# Patient Record
Sex: FEMALE | Race: WHITE | NOT HISPANIC OR LATINO | Employment: OTHER | URBAN - NONMETROPOLITAN AREA
[De-identification: names, ages, dates, MRNs, and addresses within clinical notes are randomized per-mention and may not be internally consistent; named-entity substitution may affect disease eponyms.]

---

## 2021-03-09 ENCOUNTER — OFFICE VISIT (OUTPATIENT)
Dept: OBSTETRICS AND GYNECOLOGY | Facility: CLINIC | Age: 41
End: 2021-03-09

## 2021-03-09 VITALS — SYSTOLIC BLOOD PRESSURE: 120 MMHG | DIASTOLIC BLOOD PRESSURE: 72 MMHG | WEIGHT: 124.4 LBS | BODY MASS INDEX: 22.75 KG/M2

## 2021-03-09 DIAGNOSIS — N84.1 CERVICAL POLYP: ICD-10-CM

## 2021-03-09 DIAGNOSIS — Z12.4 ENCOUNTER FOR PAPANICOLAOU SMEAR FOR CERVICAL CANCER SCREENING: ICD-10-CM

## 2021-03-09 DIAGNOSIS — Z01.419 WOMEN'S ANNUAL ROUTINE GYNECOLOGICAL EXAMINATION: Primary | ICD-10-CM

## 2021-03-09 PROCEDURE — 99386 PREV VISIT NEW AGE 40-64: CPT | Performed by: OBSTETRICS & GYNECOLOGY

## 2021-03-09 RX ORDER — CITALOPRAM 10 MG/1
10 TABLET ORAL NIGHTLY
COMMUNITY

## 2021-03-09 NOTE — PROGRESS NOTES
[facility]  Gynecology    CC: Annual well woman exam    HPI  John Puga is a 40 y.o.  premenopausal female who presents for her annual well woman exam.  The patient has no complaints.  Denies any vaginal itching, burning, irritation, or discharge.  Denies any abnormal uterine bleeding.  Continues to be sexually active.  Denies any sexual dysfunction concerns.  Denies any urinary symptoms including incontinence, dysuria, frequency, urgency, nocturia.    She exercises regularly: yes.  She wears her seat belt:yes.  She has concerns about domestic violence: no.  She has noticed changes in height: no.    She was told that at her last pap smear, she had a lesion on her cervix and would like that checked.    She is interested in conceiving again.  She is working with a hormone specialist and an herbalist as well.    ROS  Review of Systems   Constitutional: Negative.    HENT: Negative.    Eyes: Negative.    Respiratory: Negative.    Cardiovascular: Negative.    Gastrointestinal: Negative.    Endocrine: Negative.    Genitourinary: Negative.    Musculoskeletal: Negative.    Skin: Negative.    Neurological: Negative.    Hematological: Negative.    Psychiatric/Behavioral: Positive for dysphoric mood.      HEALTH MAINTENANCE  Mammogram: Declines  Colonoscopy: N/A  DEXA scan: N/A  Pap smear:   Last Completed Pap Smear       Status Date      PAP SMEAR Done 2014 CONVERTED (HISTORICAL) GYN CYTOLOGY     Patient has more history with this topic...        GYN HISTORY  Menarche: age 12  Menses: Regular, every 28 days, lasts 3 days, minimal flow, no intermenstrual bleeding  History of STIs: None  Last pap smear:   Last Completed Pap Smear       Status Date      PAP SMEAR Done 2014 CONVERTED (HISTORICAL) GYN CYTOLOGY     Patient has more history with this topic...      Abnormal pap smear history: None  Contraception: None    OB HISTORY  OB History    Para Term  AB Living   7 4 4   3 4    SAB TAB Ectopic Molar Multiple Live Births   3         4      # Outcome Date GA Lbr Tani/2nd Weight Sex Delivery Anes PTL Lv   7 Term         FABIO   6 Term      CS-Unspec   FABIO   5 Term      Vag-Spont   FABIO   4 Term      Vag-Spont   FABIO   3 SAB      SAB      2 SAB      SAB      1 SAB      SAB        PAST MEDICAL HISTORY  Past Medical History:   Diagnosis Date   • Acquired hypothyroidism    • Urge incontinence      PAST SURGICAL HISTORY  Past Surgical History:   Procedure Laterality Date   •  SECTION  2011     FAMILY HISTORY  Family History   Problem Relation Age of Onset   • Other Mother         possible mesothelioma   • Lung cancer Mother    • Kidney failure Paternal Grandfather    • Birth defects Neg Hx    • Breast cancer Neg Hx    • Ovarian cancer Neg Hx    • Endometrial cancer Neg Hx    • Colon cancer Neg Hx    • Diabetes Neg Hx    • Hypertension Neg Hx      SOCIAL HISTORY  Social History     Socioeconomic History   • Marital status:      Spouse name: Not on file   • Number of children: Not on file   • Years of education: Not on file   • Highest education level: Not on file   Tobacco Use   • Smoking status: Never Smoker   Substance and Sexual Activity   • Alcohol use: No   • Drug use: No     HOME MEDICATIONS  Prior to Admission medications    Medication Sig Start Date End Date Taking? Authorizing Provider   citalopram (CeleXA) 10 MG tablet citalopram 10 mg tablet   TAKE ONE TABLET BY MOUTH DAILY   Yes Jun Horner MD   Multiple Vitamins-Minerals (COMPLETE MULTIVITAMIN/MINERAL PO) Take  by mouth daily. .[id number]   Yes Jun Horner MD     ALLERGIES  No Known Allergies    PE  /72 (BP Location: Left arm, Patient Position: Sitting, Cuff Size: Adult)   Wt 56.4 kg (124 lb 6.4 oz)   Breastfeeding No   BMI 22.75 kg/m²        General: Alert, healthy, no distress, well nourished and well developed   Neurologic: Alert, oriented to person, place, and time.  Gait  normal.  Cranial nerves II-XII grossly intact.  HEENT: Normocephalic, atraumatic.  Extraocular muscles intact, pupils equal and reactive times two.    Neck: Supple, no adenopathy, thyroid normal size, non-tender, without nodularity, trachea midline   Breasts: No masses, skin dimpling, skin retraction, nipple discharge, or asymmetry bilaterally.  Lungs: Normal respiratory effort.  Clear to auscultation bilaterally.   Heart: Regular rate and rhythm, without murmer, rub or gallop.   Abdomen: Soft, non-tender, non-distended,no masses, no hepatosplenomegaly, no hernia.    Skin: No rash, no lesions.  Extremities: No cyanosis, clubbing or edema  PELVIC EXAM:  External Genitalia/Vulva: Anatomy is normal, no significant redness of labia, no discharge on vulvar tissues, Bellingham's and Bartholin's glands are normal, no ulcers, no condylomatous lesions.  Urethral meatus: Normal, no lesions, no prolapse.  Urethra: Normal, no masses, no tenderness with palpation.  Bladder: Normal, no fullness, no masses, no tenderness with palpation.  Vagina: Vaginal tissues are not inflamed, normal color and texture, no significant discharge present.  Pelvic support adequate.  Cervix: Normal, no purulent discharge, no cervical motion tenderness.  Cervical polyp noted.  Attempted to remove but thick stalk and unable to be removed.  Silver nitrate applied.  Uterus: Normal size, shape, and consistency.  Good mobility noted.  Minimal descent noted with good support.  Adnexa: Normal size and shape bilaterally, no palpable mass bilaterally and non-tender bilaterally.  Rectal: Normal, no masses or polyps, confirms bimanual exam, perianal normal, no lesions; GABRIELLE deferred.    IMPRESSION  John Puga is a 40 y.o.  presenting with annual gynecological exam.    PLAN    1. Women's annual routine gynecological examination  - Encouraged mammogram; declines  - Counseled SBE, cervical cancer screening    2. Encounter for Papanicolaou smear for cervical  cancer screening  - Liquid-based Pap Smear, Screening    3. Cervical polyp  Discussed that this would not impede conception but could cause irregular bleeding.  Discussed that since the base is quite thick, it may require removal in the OR.  Discussed that this could also be a cervical tear from a prior delivery; she states that her 2nd delivery was particularly bad.    This document has been electronically signed by Yesica Funk MD on March 9, 2021 09:51 CST.

## 2021-03-11 LAB
LAB AP CASE REPORT: NORMAL
PATH INTERP SPEC-IMP: NORMAL

## 2021-06-24 ENCOUNTER — OFFICE VISIT (OUTPATIENT)
Dept: OBSTETRICS AND GYNECOLOGY | Facility: CLINIC | Age: 41
End: 2021-06-24

## 2021-06-24 ENCOUNTER — LAB (OUTPATIENT)
Dept: LAB | Facility: HOSPITAL | Age: 41
End: 2021-06-24

## 2021-06-24 ENCOUNTER — TELEPHONE (OUTPATIENT)
Dept: OBSTETRICS AND GYNECOLOGY | Facility: CLINIC | Age: 41
End: 2021-06-24

## 2021-06-24 VITALS
DIASTOLIC BLOOD PRESSURE: 60 MMHG | HEIGHT: 61 IN | BODY MASS INDEX: 23.15 KG/M2 | SYSTOLIC BLOOD PRESSURE: 110 MMHG | WEIGHT: 122.6 LBS

## 2021-06-24 DIAGNOSIS — O20.0 THREATENED ABORTION: ICD-10-CM

## 2021-06-24 DIAGNOSIS — O26.891 RH NEGATIVE STATUS DURING PREGNANCY IN FIRST TRIMESTER: ICD-10-CM

## 2021-06-24 DIAGNOSIS — Z67.91 RH NEGATIVE, ANTEPARTUM: ICD-10-CM

## 2021-06-24 DIAGNOSIS — Z67.91 RH NEGATIVE, ANTEPARTUM: Primary | ICD-10-CM

## 2021-06-24 DIAGNOSIS — O20.0 THREATENED ABORTION: Primary | ICD-10-CM

## 2021-06-24 DIAGNOSIS — O26.899 RH NEGATIVE, ANTEPARTUM: Primary | ICD-10-CM

## 2021-06-24 DIAGNOSIS — O26.899 RH NEGATIVE, ANTEPARTUM: ICD-10-CM

## 2021-06-24 DIAGNOSIS — Z67.91 RH NEGATIVE STATUS DURING PREGNANCY IN FIRST TRIMESTER: ICD-10-CM

## 2021-06-24 LAB — BLD GP AB SCN SERPL QL: NEGATIVE

## 2021-06-24 PROCEDURE — 36415 COLL VENOUS BLD VENIPUNCTURE: CPT

## 2021-06-24 PROCEDURE — 96372 THER/PROPH/DIAG INJ SC/IM: CPT | Performed by: OBSTETRICS & GYNECOLOGY

## 2021-06-24 PROCEDURE — 86850 RBC ANTIBODY SCREEN: CPT

## 2021-06-24 PROCEDURE — 99213 OFFICE O/P EST LOW 20 MIN: CPT | Performed by: OBSTETRICS & GYNECOLOGY

## 2021-06-24 NOTE — TELEPHONE ENCOUNTER
Patient is wanting to get a callback, she thinks she may have miscarried a twin, she started bleeding yesterday and passed a clot  She went to [facility] and had a Ultrasound and the person told her that she may have have miscarried a twin

## 2021-06-24 NOTE — TELEPHONE ENCOUNTER
Called and spoke with the pt and told her that per Dr. Farrell, she should come in for bloodwork and a Rhogam shot.  Pt verbalized understanding and I put her on the schedule for today.

## 2021-06-25 PROBLEM — O26.891 RH NEGATIVE STATUS DURING PREGNANCY IN FIRST TRIMESTER: Status: ACTIVE | Noted: 2021-06-25

## 2021-06-25 PROBLEM — Z67.91 RH NEGATIVE STATUS DURING PREGNANCY IN FIRST TRIMESTER: Status: ACTIVE | Noted: 2021-06-25

## 2021-06-25 NOTE — PROGRESS NOTES
John Puga is a 40 y.o. y/o female.     Chief Complaint: Threatened     HPI:   40 y.o. .  Patient's last menstrual period was 2021..  Patient had some bleeding and was seen at the  ultrasound center in [place] which does none medical ultrasounds.  They diagnosed viability of an early pregnancy about 6weeks and said that it may have been a miscarriage of 1 of a twin pregnancy.  She has had some vaginal bleeding a moderate amount is pretty much stopped it was both dark red and bright red with some crampy abdominal pain          Review of Systems     Constitutional: Denies night sweats    HENT: No hearing changes, denies ear pain    Eye: No eye pain; no foreign body in eye    Pulmonary: No hemoptysis    Cardiovascular: No claudication    GI: No hematemesis    Musculoskeletal: No arthralgias, no joint swelling    Endocrine: No polydipsia or polyuria    Hematologic: Denies any free bleeding    Psychiatric: Denies any delusions    The following portions of the patient's history were reviewed and updated as appropriate: allergies, current medications, past family history, past medical history, past social history, past surgical history and problem list.    No Known Allergies     Outpatient Medications Prior to Visit   Medication Sig Dispense Refill   • citalopram (CeleXA) 10 MG tablet citalopram 10 mg tablet   TAKE ONE TABLET BY MOUTH DAILY     • Multiple Vitamins-Minerals (COMPLETE MULTIVITAMIN/MINERAL PO) Take  by mouth daily. .[id number]       No facility-administered medications prior to visit.        The patient has a family history of   Family History   Problem Relation Age of Onset   • Other Mother         possible mesothelioma   • Lung cancer Mother    • Kidney failure Paternal Grandfather    • Birth defects Neg Hx    • Breast cancer Neg Hx    • Ovarian cancer Neg Hx    • Endometrial cancer Neg Hx    • Colon cancer Neg Hx    • Diabetes Neg Hx    • Hypertension Neg Hx      "    Past Medical History:   Diagnosis Date   • Acquired hypothyroidism    • Urge incontinence         OB History        7    Para   4    Term   4            AB   3    Living   4       SAB   3    TAB        Ectopic        Molar        Multiple        Live Births   4                 Social History     Socioeconomic History   • Marital status:      Spouse name: Not on file   • Number of children: Not on file   • Years of education: Not on file   • Highest education level: Not on file   Tobacco Use   • Smoking status: Never Smoker   Substance and Sexual Activity   • Alcohol use: No   • Drug use: No        Past Surgical History:   Procedure Laterality Date   •  SECTION  2011        Patient Active Problem List   Diagnosis   (none) - all problems resolved or deleted        Documented Vitals    21 1335   BP: 110/60   Weight: 55.6 kg (122 lb 9.6 oz)   Height: 154.9 cm (61\")        Body mass index is 23.17 kg/m².    Physical Exam  Constitutional: Appears to be in no acute distress; Eyes: Sclerae normal; Endocrine system: Thyroid palpation is normal; Pulmonary system: Lungs clear; Cardiovascular system: Heart regular rate and rhythm; Gastrointestinal system: Abdomen soft and nontender, active bowel sounds; Urologic system: CVA negative; Psychiatric: Appropriate insight; Neurologic: Gait within normal limits    Laboratory Data:   No results for input(s): GLUCOSE, BUN, CREATININE, NA, K, CL, CO2, CALCIUM, PROTEINTOT, ALBUMIN, ALT, AST, ALKPHOS, BILITOT, EGFRIFNONA, GLOB, AGRATIO, BCR, ANIONGAP, BILIDIR, INDBILI in the last 84310 hours.  No results for input(s): WBC, RBC, HGB, HCT, MCV, MCH, MCHC, RDW, RDWSD, MPV, PLT in the last 85934 hours.  No results for input(s): HCGQUAL in the last 49809 hours.    Assessment        Diagnosis Plan   1. Threatened    patient is known Rh- we obtained a antibody screen and then gave her RhoGam.  I advised an ultrasound medical with report here in " 2weeks she said she really likes the 40 ultrasounds because they are only $50         Plan       No orders of the defined types were placed in this encounter.            This document has been electronically signed by Americo Farrell MD on June 25, 2021 13:02 CDT    Please note that portions of this note were completed with a voice recognition program.

## 2021-07-26 ENCOUNTER — OFFICE VISIT (OUTPATIENT)
Dept: OBSTETRICS AND GYNECOLOGY | Facility: CLINIC | Age: 41
End: 2021-07-26

## 2021-07-26 ENCOUNTER — TELEPHONE (OUTPATIENT)
Dept: OBSTETRICS AND GYNECOLOGY | Facility: CLINIC | Age: 41
End: 2021-07-26

## 2021-07-26 VITALS
WEIGHT: 121 LBS | SYSTOLIC BLOOD PRESSURE: 108 MMHG | DIASTOLIC BLOOD PRESSURE: 64 MMHG | HEIGHT: 61 IN | BODY MASS INDEX: 22.84 KG/M2

## 2021-07-26 DIAGNOSIS — O36.80X0 PREGNANCY WITH INCONCLUSIVE FETAL VIABILITY, SINGLE OR UNSPECIFIED FETUS: Primary | ICD-10-CM

## 2021-07-26 DIAGNOSIS — O02.0 ANEMBRYONIC PREGNANCY: Primary | ICD-10-CM

## 2021-07-26 PROCEDURE — 99213 OFFICE O/P EST LOW 20 MIN: CPT | Performed by: NURSE PRACTITIONER

## 2021-07-26 RX ORDER — THYROID 30 MG/1
15 TABLET ORAL EVERY MORNING
COMMUNITY
Start: 2021-07-12

## 2021-07-26 RX ORDER — MISOPROSTOL 200 UG/1
800 TABLET ORAL ONCE
Qty: 4 TABLET | Refills: 0 | Status: SHIPPED | OUTPATIENT
Start: 2021-07-26 | End: 2021-07-26

## 2021-07-26 NOTE — TELEPHONE ENCOUNTER
"Patient called stating she is supposed to be about 12 weeks pregnant and that she \"had been seen at [facility] ultrasound over a week ago and that they told her her baby did not have a heartbeat and that she has not started to miscarry on her own\".  wanting to come in today for an appointment and ultrasound. Let her know I spoke with dr de anda and that sometimes it can take several weeks for her to miscarry on her own and in order to avoid surgery we can give it another week and follow up with her, and get her scheduled for US and blood work next week.    Patient states she does not want \"a big ultrasound, she just wants a doctor to come in with the handheld to check for a heartbeat\"  Let her know that the handheld does not have audio and we would not be able to hear the heartbeat and she would need an official ultrasound to determine viability of pregnancy.    Patient states she \"would see what she can do\" and hung up.  "

## 2021-07-26 NOTE — PROGRESS NOTES
Subjective   John Puga is a 40 y.o. here for US viability      John Puga is a 40 yr old  who presents today for US viability. She reports she had an ultrasound at [facility] and was told the baby did not have a heartbeat. Was told she would miscarry on her own. Pt has had some spotting and desires medical management at this time. Does not want to wait to miscarry on her own. Pt is A negative and received Rhogam on .    Narrative & Impression  Ultrasound OB transvaginal.     HISTORY: Assess fetal viability.     FINDINGS: Transvaginal examination demonstrates an irregular  shaped gestational sac. There is no fetal pole or cardiac  activity. This therefore suggests either blighted ovum or  intrauterine embryonic demise and resorption..     Left ovary is normal.     The right ovary not visualized.     IMPRESSION:  As above.     Electronically signed by:  Bartolo Collins MD  2021 2:35 PM CDT  Workstation: [id number]          The following portions of the patient's history were reviewed and updated as appropriate: allergies, current medications, past family history, past medical history, past social history, past surgical history and problem list.    Review of Systems   Constitutional: Negative for chills, fatigue and fever.   Respiratory: Negative for shortness of breath.    Cardiovascular: Negative for chest pain and palpitations.   Gastrointestinal: Negative for abdominal pain, constipation, diarrhea and nausea.   Genitourinary: Negative for dysuria, frequency, menstrual problem, pelvic pressure, vaginal bleeding, vaginal discharge and vaginal pain.   Skin: Negative for rash.   Neurological: Negative for headache.   Psychiatric/Behavioral: Negative for depressed mood.       Objective   Physical Exam  Vitals and nursing note reviewed.   Constitutional:       Appearance: She is well-developed.   HENT:      Head: Normocephalic.   Pulmonary:      Effort: Pulmonary effort is normal.    Musculoskeletal:         General: Normal range of motion.   Skin:     General: Skin is dry.   Neurological:      Mental Status: She is alert and oriented to person, place, and time.   Psychiatric:         Behavior: Behavior normal.           Assessment/Plan   Diagnoses and all orders for this visit:    1. Anembryonic pregnancy (Primary)  -     hCG, Quantitative, Pregnancy; Future    Other orders  -     miSOPROStol (CYTOTEC) 200 MCG tablet; Take 4 tablets by mouth 1 (One) Time for 1 dose.  Dispense: 4 tablet; Refill: 0        Reviewed preliminary US finding at this time with pt. Reviewed instructions for cytotec; call in 3 days if she does not have additional bleeding. May need another dose. Return in 1 weeks to repeat HCG level, will need to follow weekly.

## 2021-07-28 ENCOUNTER — TELEPHONE (OUTPATIENT)
Dept: OBSTETRICS AND GYNECOLOGY | Facility: CLINIC | Age: 41
End: 2021-07-28

## 2021-07-28 RX ORDER — MISOPROSTOL 200 UG/1
800 TABLET ORAL ONCE
Qty: 4 TABLET | Refills: 0 | Status: SHIPPED | OUTPATIENT
Start: 2021-07-28 | End: 2021-07-28

## 2021-07-28 NOTE — TELEPHONE ENCOUNTER
Pt called and states she mistakenly have been only taking 1 pill of cytotec and has 2 pills left. She read the bottle and realize her mistake. She is having some spotting. Desires another rx. Reviewed to take all 4 pills at once. Rx sent.

## 2021-07-29 ENCOUNTER — TELEPHONE (OUTPATIENT)
Dept: OBSTETRICS AND GYNECOLOGY | Facility: CLINIC | Age: 41
End: 2021-07-29

## 2021-07-29 NOTE — TELEPHONE ENCOUNTER
Patient called at this time stating nothing has happened since she took the medicine and is wanting to know if she needs another dose. Verified that it was the 4 pill at once from yesterday and she stated it was. Let her know that since she just took the medication it can take some time but I would send a message to let you know.

## 2021-07-30 ENCOUNTER — TELEPHONE (OUTPATIENT)
Dept: OBSTETRICS AND GYNECOLOGY | Facility: CLINIC | Age: 41
End: 2021-07-30

## 2021-07-30 RX ORDER — MISOPROSTOL 200 UG/1
TABLET ORAL
Qty: 4 TABLET | Refills: 0 | Status: SHIPPED | OUTPATIENT
Start: 2021-07-30 | End: 2021-08-30

## 2021-08-02 ENCOUNTER — LAB (OUTPATIENT)
Dept: LAB | Facility: HOSPITAL | Age: 41
End: 2021-08-02

## 2021-08-02 ENCOUNTER — TELEPHONE (OUTPATIENT)
Dept: OBSTETRICS AND GYNECOLOGY | Facility: CLINIC | Age: 41
End: 2021-08-02

## 2021-08-02 DIAGNOSIS — O02.0 ANEMBRYONIC PREGNANCY: ICD-10-CM

## 2021-08-02 PROCEDURE — 84702 CHORIONIC GONADOTROPIN TEST: CPT

## 2021-08-02 NOTE — TELEPHONE ENCOUNTER
John Puga called and desires to give an update. She has taken 2 doses of cytotec and has had only some spotting. She desires to monitor and see if she will pass the pregnancy on her own at this time. Counseled pt that is fine but we need to monitor her HCG quant every week. Pt verbalizes understanding and will try schedule transportation to the lab in [facility] today.

## 2021-08-03 LAB — HCG INTACT+B SERPL-ACNC: NORMAL MIU/ML

## 2021-08-04 ENCOUNTER — TELEPHONE (OUTPATIENT)
Dept: OBSTETRICS AND GYNECOLOGY | Facility: CLINIC | Age: 41
End: 2021-08-04

## 2021-08-04 NOTE — TELEPHONE ENCOUNTER
Consulted with Dr. Farrell regarding John Puga. Per Dr. Farrell, recommended allowing patient to monitor for another 4 weeks  and will need to repeat US at that time. However, if patient has any lateral pain, pt needs to be seen. Does not need weekly HCG at this time. Discussed this plan of care with the patient and she is agreeable. She reports more bleeding at this time but doing well.

## 2021-08-11 DIAGNOSIS — O36.80X0 PREGNANCY WITH INCONCLUSIVE FETAL VIABILITY, SINGLE OR UNSPECIFIED FETUS: Primary | ICD-10-CM

## 2021-08-11 DIAGNOSIS — O02.1 MISSED ABORTION: ICD-10-CM

## 2021-08-30 ENCOUNTER — OFFICE VISIT (OUTPATIENT)
Dept: OBSTETRICS AND GYNECOLOGY | Facility: CLINIC | Age: 41
End: 2021-08-30

## 2021-08-30 ENCOUNTER — PRE-ADMISSION TESTING (OUTPATIENT)
Dept: PREADMISSION TESTING | Facility: HOSPITAL | Age: 41
End: 2021-08-30

## 2021-08-30 VITALS
HEART RATE: 65 BPM | RESPIRATION RATE: 18 BRPM | BODY MASS INDEX: 22.84 KG/M2 | OXYGEN SATURATION: 97 % | HEIGHT: 61 IN | WEIGHT: 121 LBS

## 2021-08-30 VITALS — SYSTOLIC BLOOD PRESSURE: 110 MMHG | BODY MASS INDEX: 22.86 KG/M2 | WEIGHT: 121 LBS | DIASTOLIC BLOOD PRESSURE: 64 MMHG

## 2021-08-30 DIAGNOSIS — O03.4 INCOMPLETE ABORTION: ICD-10-CM

## 2021-08-30 DIAGNOSIS — O03.4 INCOMPLETE ABORTION: Primary | ICD-10-CM

## 2021-08-30 DIAGNOSIS — Z67.91 RH NEGATIVE STATUS DURING PREGNANCY IN FIRST TRIMESTER: ICD-10-CM

## 2021-08-30 DIAGNOSIS — O26.891 RH NEGATIVE STATUS DURING PREGNANCY IN FIRST TRIMESTER: ICD-10-CM

## 2021-08-30 LAB
ABO GROUP BLD: NORMAL
ANION GAP SERPL CALCULATED.3IONS-SCNC: 9 MMOL/L (ref 5–15)
ANTI-D, PASSIVE: NORMAL
BASOPHILS # BLD AUTO: 0.03 10*3/MM3 (ref 0–0.2)
BASOPHILS NFR BLD AUTO: 0.4 % (ref 0–1.5)
BLD GP AB SCN SERPL QL: POSITIVE
BUN SERPL-MCNC: 13 MG/DL (ref 6–20)
BUN/CREAT SERPL: 17.6 (ref 7–25)
CALCIUM SPEC-SCNC: 9.4 MG/DL (ref 8.6–10.5)
CHLORIDE SERPL-SCNC: 103 MMOL/L (ref 98–107)
CO2 SERPL-SCNC: 26 MMOL/L (ref 22–29)
CREAT SERPL-MCNC: 0.74 MG/DL (ref 0.57–1)
DEPRECATED RDW RBC AUTO: 40.4 FL (ref 37–54)
EOSINOPHIL # BLD AUTO: 0.07 10*3/MM3 (ref 0–0.4)
EOSINOPHIL NFR BLD AUTO: 1 % (ref 0.3–6.2)
ERYTHROCYTE [DISTWIDTH] IN BLOOD BY AUTOMATED COUNT: 12.3 % (ref 12.3–15.4)
GFR SERPL CREATININE-BSD FRML MDRD: 87 ML/MIN/1.73
GLUCOSE SERPL-MCNC: 90 MG/DL (ref 65–99)
HCT VFR BLD AUTO: 38.7 % (ref 34–46.6)
HGB BLD-MCNC: 12.8 G/DL (ref 12–15.9)
IMM GRANULOCYTES # BLD AUTO: 0.02 10*3/MM3 (ref 0–0.05)
IMM GRANULOCYTES NFR BLD AUTO: 0.3 % (ref 0–0.5)
LYMPHOCYTES # BLD AUTO: 1.41 10*3/MM3 (ref 0.7–3.1)
LYMPHOCYTES NFR BLD AUTO: 20.7 % (ref 19.6–45.3)
Lab: NORMAL
MCH RBC QN AUTO: 29.6 PG (ref 26.6–33)
MCHC RBC AUTO-ENTMCNC: 33.1 G/DL (ref 31.5–35.7)
MCV RBC AUTO: 89.4 FL (ref 79–97)
MONOCYTES # BLD AUTO: 0.57 10*3/MM3 (ref 0.1–0.9)
MONOCYTES NFR BLD AUTO: 8.4 % (ref 5–12)
NEUTROPHILS NFR BLD AUTO: 4.72 10*3/MM3 (ref 1.7–7)
NEUTROPHILS NFR BLD AUTO: 69.2 % (ref 42.7–76)
NRBC BLD AUTO-RTO: 0 /100 WBC (ref 0–0.2)
PLATELET # BLD AUTO: 132 10*3/MM3 (ref 140–450)
PMV BLD AUTO: 11 FL (ref 6–12)
POTASSIUM SERPL-SCNC: 4.2 MMOL/L (ref 3.5–5.2)
RBC # BLD AUTO: 4.33 10*6/MM3 (ref 3.77–5.28)
RH BLD: NEGATIVE
SODIUM SERPL-SCNC: 138 MMOL/L (ref 136–145)
T&S EXPIRATION DATE: NORMAL
T4 FREE SERPL-MCNC: 1.5 NG/DL (ref 0.93–1.7)
TSH SERPL DL<=0.05 MIU/L-ACNC: 0.02 UIU/ML (ref 0.27–4.2)
WBC # BLD AUTO: 6.82 10*3/MM3 (ref 3.4–10.8)

## 2021-08-30 PROCEDURE — 86870 RBC ANTIBODY IDENTIFICATION: CPT

## 2021-08-30 PROCEDURE — 85025 COMPLETE CBC W/AUTO DIFF WBC: CPT

## 2021-08-30 PROCEDURE — 84439 ASSAY OF FREE THYROXINE: CPT

## 2021-08-30 PROCEDURE — 86900 BLOOD TYPING SEROLOGIC ABO: CPT

## 2021-08-30 PROCEDURE — 86901 BLOOD TYPING SEROLOGIC RH(D): CPT

## 2021-08-30 PROCEDURE — 36415 COLL VENOUS BLD VENIPUNCTURE: CPT

## 2021-08-30 PROCEDURE — 86850 RBC ANTIBODY SCREEN: CPT

## 2021-08-30 PROCEDURE — C9803 HOPD COVID-19 SPEC COLLECT: HCPCS

## 2021-08-30 PROCEDURE — 84443 ASSAY THYROID STIM HORMONE: CPT

## 2021-08-30 PROCEDURE — 87635 SARS-COV-2 COVID-19 AMP PRB: CPT

## 2021-08-30 PROCEDURE — 80048 BASIC METABOLIC PNL TOTAL CA: CPT

## 2021-08-30 PROCEDURE — 99214 OFFICE O/P EST MOD 30 MIN: CPT | Performed by: OBSTETRICS & GYNECOLOGY

## 2021-08-30 RX ORDER — SODIUM CHLORIDE 0.9 % (FLUSH) 0.9 %
10 SYRINGE (ML) INJECTION AS NEEDED
Status: CANCELLED | OUTPATIENT
Start: 2021-09-01

## 2021-08-30 RX ORDER — PROGESTERONE 200 MG/1
200 CAPSULE ORAL DAILY
COMMUNITY
End: 2021-09-14

## 2021-08-30 RX ORDER — MULTIPLE VITAMINS W/ MINERALS TAB 9MG-400MCG
4 TAB ORAL DAILY
COMMUNITY
End: 2022-09-13 | Stop reason: HOSPADM

## 2021-08-30 RX ORDER — SODIUM CHLORIDE, SODIUM LACTATE, POTASSIUM CHLORIDE, CALCIUM CHLORIDE 600; 310; 30; 20 MG/100ML; MG/100ML; MG/100ML; MG/100ML
125 INJECTION, SOLUTION INTRAVENOUS CONTINUOUS
Status: CANCELLED | OUTPATIENT
Start: 2021-09-01

## 2021-08-30 RX ORDER — DOXYCYCLINE 100 MG/1
100 CAPSULE ORAL ONCE
Status: CANCELLED | OUTPATIENT
Start: 2021-09-01 | End: 2021-08-30

## 2021-08-30 RX ORDER — SODIUM CHLORIDE 0.9 % (FLUSH) 0.9 %
3 SYRINGE (ML) INJECTION EVERY 12 HOURS SCHEDULED
Status: CANCELLED | OUTPATIENT
Start: 2021-09-01

## 2021-08-30 NOTE — PROGRESS NOTES
[facility]  Gynecology  Date of Service: 2021    CC: Ultrasound follow-up    HPI  John Puga is a 40 y.o.  premenopausal female who presents for ultrasound follow-up regarding pregnancy viability.      She was seen on  for bleeding and had been to the  ultrasound center in [place] which showed viability of an early intrauterine pregnancy about 6 weeks and said possibly a miscarriage of 1 fetus of a twin pregnancy.  She was given RhoGAM at that time.  She called on  stating that she had been seen at [facility] ultrasound over a week ago and that they told her her baby did not have a heartbeat and she had not yet miscarried; we recommended an ultrasound and lab work at our facility with a visit following.  Ultrasound showed a blighted ovum with no fetal pole; she did not do any labs.  She was given Cytotec on  and called on  for repeat dose because she had taken them incorrectly.  She had a 3rd dose on .     She presents today for consultation.  She states that she has had some off and on bleeding but has not passed any tissue.  She is emotionally worn out from this.    ROS  Review of Systems   Constitutional: Negative.    HENT: Negative.    Eyes: Negative.    Respiratory: Negative.    Cardiovascular: Negative.    Gastrointestinal: Negative.    Endocrine: Negative.    Genitourinary: Positive for vaginal bleeding.   Musculoskeletal: Negative.    Skin: Negative.    Allergic/Immunologic: Negative.    Neurological: Negative.    Hematological: Negative.    Psychiatric/Behavioral: Negative.      OB HISTORY  OB History    Para Term  AB Living   7 4 4   3 4   SAB TAB Ectopic Molar Multiple Live Births   3         4      # Outcome Date GA Lbr Tani/2nd Weight Sex Delivery Anes PTL Lv   7 Term         FABIO   6 Term      CS-Unspec   FABIO   5 Term      Vag-Spont   FABIO   4 Term      Vag-Spont   FABIO   3 SAB      SAB      2 SAB      SAB      1 SAB       SAB        PAST MEDICAL HISTORY  Past Medical History:   Diagnosis Date   • Acquired hypothyroidism    • Urge incontinence      PAST SURGICAL HISTORY  Past Surgical History:   Procedure Laterality Date   •  SECTION  2011     FAMILY HISTORY  Family History   Problem Relation Age of Onset   • Other Mother         possible mesothelioma   • Lung cancer Mother    • Kidney failure Paternal Grandfather    • Birth defects Neg Hx    • Breast cancer Neg Hx    • Ovarian cancer Neg Hx    • Endometrial cancer Neg Hx    • Colon cancer Neg Hx    • Diabetes Neg Hx    • Hypertension Neg Hx      SOCIAL HISTORY  Social History     Socioeconomic History   • Marital status:      Spouse name: Not on file   • Number of children: Not on file   • Years of education: Not on file   • Highest education level: Not on file   Tobacco Use   • Smoking status: Never Smoker   Substance and Sexual Activity   • Alcohol use: No   • Drug use: No     ALLERGIES  No Known Allergies    HOME MEDICATIONS  Prior to Admission medications    Medication Sig Start Date End Date Taking? Authorizing Provider   Kathryn Thyroid 30 MG tablet Take 30 mg by mouth Every Morning. 21  Yes Jun Horner MD   citalopram (CeleXA) 10 MG tablet citalopram 10 mg tablet   TAKE ONE TABLET BY MOUTH DAILY   Yes Jun Horner MD   miSOPROStol (CYTOTEC) 200 MCG tablet Insert 4 pills in the vagina at once. 21  Yes Lana Stahl APRN   Multiple Vitamins-Minerals (COMPLETE MULTIVITAMIN/MINERAL PO) Take  by mouth daily. .[id number]   Yes Jun Horner MD     PE  /64 (BP Location: Left arm, Patient Position: Sitting, Cuff Size: Adult)   Wt 54.9 kg (121 lb)   BMI 22.86 kg/m²        General: Alert, healthy, no distress, well nourished and well developed.  Neurologic: Alert, oriented to person, place, and time.  Gait normal.  Cranial nerves II-XII grossly intact.  HEENT: Normocephalic, atraumatic.  Extraocular muscles  intact, pupils equal and reactive times two.    Neck: Supple, no adenopathy, thyroid normal size, non-tender, without nodularity, trachea midline.  Lungs: Normal respiratory effort.  Clear to auscultation bilaterally.  No wheezes, rhonci, or rales.  Heart: Regular rate and rhythm.  No murmer, rub or gallop.  Abdomen: Soft, non-tender, non-distended,no masses, no hepatosplenomegaly, no hernia.  Skin: No rash, no lesions.  Extremities: No cyanosis, clubbing or edema.    IMPRESSION  John Puga is a 40 y.o.  presenting with incomplete .  Given that products of conception remain ~8 weeks after initial US, I recommend surgical management.  Patient and her  are agreeable.  Will proceed with suction dilation and curettage.  Discussed R/B/A.  Discussed risks including injury to surrounding organ (bowel, bladder, ureters, blood vessels, nerves), infection, bleeding (possibly requiring blood transfusion, hysterectomy), scarring, uterine perforation, heart attack, stroke, and death.  Will give pre-op ABX (PO doxycycline).    PLAN    1. Incomplete   - Case Request; Standing  - CBC and Differential; Future  - Basic Metabolic Panel; Future  - Type & Screen; Future  - sodium chloride 0.9 % flush 3 mL  - sodium chloride 0.9 % flush 10 mL  - lactated ringers infusion  - doxycycline (MONODOX) capsule 100 mg  - Case Request    2. Rh negative status during pregnancy in first trimester  - RhoGAM previously administered         This document has been electronically signed by Yesica Funk MD on 2021 14:11 CDT.

## 2021-08-31 LAB — SARS-COV-2 N GENE RESP QL NAA+PROBE: NOT DETECTED

## 2021-09-01 ENCOUNTER — HOSPITAL ENCOUNTER (OUTPATIENT)
Facility: HOSPITAL | Age: 41
Discharge: HOME | End: 2021-09-02
Attending: OBSTETRICS & GYNECOLOGY | Admitting: OBSTETRICS & GYNECOLOGY

## 2021-09-01 ENCOUNTER — ANESTHESIA (OUTPATIENT)
Dept: PERIOP | Facility: HOSPITAL | Age: 41
End: 2021-09-01

## 2021-09-01 ENCOUNTER — ANESTHESIA EVENT (OUTPATIENT)
Dept: PERIOP | Facility: HOSPITAL | Age: 41
End: 2021-09-01

## 2021-09-01 DIAGNOSIS — O03.4 INCOMPLETE ABORTION: ICD-10-CM

## 2021-09-01 PROBLEM — D62 ACUTE BLOOD LOSS ANEMIA: Status: ACTIVE | Noted: 2021-09-01

## 2021-09-01 LAB
ABO GROUP BLD: NORMAL
ANTI-D, PASSIVE: NORMAL
BLD GP AB SCN SERPL QL: POSITIVE
HCT VFR BLD AUTO: 25.3 % (ref 34–46.6)
HGB BLD-MCNC: 8.5 G/DL (ref 12–15.9)
Lab: NORMAL
RH BLD: NEGATIVE
T&S EXPIRATION DATE: NORMAL

## 2021-09-01 PROCEDURE — 25010000002 KETOROLAC TROMETHAMINE PER 15 MG: Performed by: OBSTETRICS & GYNECOLOGY

## 2021-09-01 PROCEDURE — 25010000002 PROPOFOL 10 MG/ML EMULSION: Performed by: NURSE ANESTHETIST, CERTIFIED REGISTERED

## 2021-09-01 PROCEDURE — 25010000002 FENTANYL CITRATE (PF) 50 MCG/ML SOLUTION: Performed by: NURSE ANESTHETIST, CERTIFIED REGISTERED

## 2021-09-01 PROCEDURE — 25010000002 ONDANSETRON PER 1 MG: Performed by: NURSE ANESTHETIST, CERTIFIED REGISTERED

## 2021-09-01 PROCEDURE — 86850 RBC ANTIBODY SCREEN: CPT | Performed by: ANESTHESIOLOGY

## 2021-09-01 PROCEDURE — 25010000002 DEXAMETHASONE PER 1 MG: Performed by: NURSE ANESTHETIST, CERTIFIED REGISTERED

## 2021-09-01 PROCEDURE — 86922 COMPATIBILITY TEST ANTIGLOB: CPT

## 2021-09-01 PROCEDURE — 86902 BLOOD TYPE ANTIGEN DONOR EA: CPT

## 2021-09-01 PROCEDURE — 86901 BLOOD TYPING SEROLOGIC RH(D): CPT | Performed by: ANESTHESIOLOGY

## 2021-09-01 PROCEDURE — 85018 HEMOGLOBIN: CPT | Performed by: OBSTETRICS & GYNECOLOGY

## 2021-09-01 PROCEDURE — 25010000002 MIDAZOLAM PER 1 MG: Performed by: NURSE ANESTHETIST, CERTIFIED REGISTERED

## 2021-09-01 PROCEDURE — 25010000002 PHENYLEPHRINE 10 MG/ML SOLUTION: Performed by: NURSE ANESTHETIST, CERTIFIED REGISTERED

## 2021-09-01 PROCEDURE — 59812 TREATMENT OF MISCARRIAGE: CPT | Performed by: SPECIALIST/TECHNOLOGIST, OTHER

## 2021-09-01 PROCEDURE — 86900 BLOOD TYPING SEROLOGIC ABO: CPT

## 2021-09-01 PROCEDURE — P9016 RBC LEUKOCYTES REDUCED: HCPCS

## 2021-09-01 PROCEDURE — 86920 COMPATIBILITY TEST SPIN: CPT

## 2021-09-01 PROCEDURE — 85014 HEMATOCRIT: CPT | Performed by: OBSTETRICS & GYNECOLOGY

## 2021-09-01 PROCEDURE — 86870 RBC ANTIBODY IDENTIFICATION: CPT | Performed by: ANESTHESIOLOGY

## 2021-09-01 PROCEDURE — 25010000002 METHYLERGONOVINE MALEATE PER 0.2 MG: Performed by: OBSTETRICS & GYNECOLOGY

## 2021-09-01 PROCEDURE — 25010000002 KETOROLAC TROMETHAMINE PER 15 MG: Performed by: NURSE ANESTHETIST, CERTIFIED REGISTERED

## 2021-09-01 PROCEDURE — 59812 TREATMENT OF MISCARRIAGE: CPT | Performed by: OBSTETRICS & GYNECOLOGY

## 2021-09-01 PROCEDURE — 86900 BLOOD TYPING SEROLOGIC ABO: CPT | Performed by: ANESTHESIOLOGY

## 2021-09-01 PROCEDURE — 36430 TRANSFUSION BLD/BLD COMPNT: CPT

## 2021-09-01 RX ORDER — IBUPROFEN 800 MG/1
800 TABLET ORAL EVERY 8 HOURS PRN
Qty: 30 TABLET | Refills: 0 | Status: SHIPPED | OUTPATIENT
Start: 2021-09-01 | End: 2022-09-13

## 2021-09-01 RX ORDER — ACETAMINOPHEN 650 MG/1
650 SUPPOSITORY RECTAL ONCE AS NEEDED
Status: DISCONTINUED | OUTPATIENT
Start: 2021-09-01 | End: 2021-09-01 | Stop reason: HOSPADM

## 2021-09-01 RX ORDER — DOCUSATE SODIUM 100 MG/1
100 CAPSULE, LIQUID FILLED ORAL 2 TIMES DAILY
Status: DISCONTINUED | OUTPATIENT
Start: 2021-09-01 | End: 2021-09-02 | Stop reason: HOSPADM

## 2021-09-01 RX ORDER — IBUPROFEN 800 MG/1
800 TABLET ORAL EVERY 8 HOURS
Status: DISCONTINUED | OUTPATIENT
Start: 2021-09-02 | End: 2021-09-02 | Stop reason: HOSPADM

## 2021-09-01 RX ORDER — ONDANSETRON 2 MG/ML
4 INJECTION INTRAMUSCULAR; INTRAVENOUS EVERY 6 HOURS PRN
Status: DISCONTINUED | OUTPATIENT
Start: 2021-09-01 | End: 2021-09-02 | Stop reason: HOSPADM

## 2021-09-01 RX ORDER — NALOXONE HCL 0.4 MG/ML
0.4 VIAL (ML) INJECTION AS NEEDED
Status: DISCONTINUED | OUTPATIENT
Start: 2021-09-01 | End: 2021-09-01 | Stop reason: HOSPADM

## 2021-09-01 RX ORDER — ONDANSETRON 2 MG/ML
4 INJECTION INTRAMUSCULAR; INTRAVENOUS ONCE AS NEEDED
Status: DISCONTINUED | OUTPATIENT
Start: 2021-09-01 | End: 2021-09-01 | Stop reason: HOSPADM

## 2021-09-01 RX ORDER — PROPOFOL 10 MG/ML
VIAL (ML) INTRAVENOUS AS NEEDED
Status: DISCONTINUED | OUTPATIENT
Start: 2021-09-01 | End: 2021-09-01 | Stop reason: SURG

## 2021-09-01 RX ORDER — TRAMADOL HYDROCHLORIDE 50 MG/1
25 TABLET ORAL ONCE
Status: COMPLETED | OUTPATIENT
Start: 2021-09-01 | End: 2021-09-01

## 2021-09-01 RX ORDER — SODIUM CHLORIDE 0.9 % (FLUSH) 0.9 %
3 SYRINGE (ML) INJECTION EVERY 12 HOURS SCHEDULED
Status: DISCONTINUED | OUTPATIENT
Start: 2021-09-01 | End: 2021-09-01 | Stop reason: HOSPADM

## 2021-09-01 RX ORDER — LIDOCAINE HYDROCHLORIDE 20 MG/ML
INJECTION, SOLUTION INFILTRATION; PERINEURAL AS NEEDED
Status: DISCONTINUED | OUTPATIENT
Start: 2021-09-01 | End: 2021-09-01 | Stop reason: SURG

## 2021-09-01 RX ORDER — ACETAMINOPHEN 500 MG
1000 TABLET ORAL ONCE
Status: COMPLETED | OUTPATIENT
Start: 2021-09-01 | End: 2021-09-01

## 2021-09-01 RX ORDER — ONDANSETRON 4 MG/1
4 TABLET, FILM COATED ORAL EVERY 6 HOURS PRN
Status: DISCONTINUED | OUTPATIENT
Start: 2021-09-01 | End: 2021-09-02 | Stop reason: HOSPADM

## 2021-09-01 RX ORDER — PHENYLEPHRINE HYDROCHLORIDE 10 MG/ML
INJECTION INTRAVENOUS AS NEEDED
Status: DISCONTINUED | OUTPATIENT
Start: 2021-09-01 | End: 2021-09-01 | Stop reason: SURG

## 2021-09-01 RX ORDER — ACETAMINOPHEN 500 MG
1000 TABLET ORAL EVERY 6 HOURS PRN
Qty: 30 TABLET | Refills: 0 | Status: SHIPPED | OUTPATIENT
Start: 2021-09-01 | End: 2022-09-13

## 2021-09-01 RX ORDER — FENTANYL CITRATE 50 UG/ML
INJECTION, SOLUTION INTRAMUSCULAR; INTRAVENOUS AS NEEDED
Status: DISCONTINUED | OUTPATIENT
Start: 2021-09-01 | End: 2021-09-01 | Stop reason: SURG

## 2021-09-01 RX ORDER — SODIUM CHLORIDE, SODIUM LACTATE, POTASSIUM CHLORIDE, CALCIUM CHLORIDE 600; 310; 30; 20 MG/100ML; MG/100ML; MG/100ML; MG/100ML
75 INJECTION, SOLUTION INTRAVENOUS CONTINUOUS
Status: DISCONTINUED | OUTPATIENT
Start: 2021-09-01 | End: 2021-09-01 | Stop reason: HOSPADM

## 2021-09-01 RX ORDER — TRAMADOL HYDROCHLORIDE 50 MG/1
50 TABLET ORAL EVERY 4 HOURS PRN
Status: DISCONTINUED | OUTPATIENT
Start: 2021-09-01 | End: 2021-09-02 | Stop reason: HOSPADM

## 2021-09-01 RX ORDER — CARBOPROST TROMETHAMINE 250 UG/ML
INJECTION, SOLUTION INTRAMUSCULAR AS NEEDED
Status: DISCONTINUED | OUTPATIENT
Start: 2021-09-01 | End: 2021-09-01 | Stop reason: HOSPADM

## 2021-09-01 RX ORDER — SODIUM CHLORIDE, SODIUM GLUCONATE, SODIUM ACETATE, POTASSIUM CHLORIDE AND MAGNESIUM CHLORIDE 526; 502; 368; 37; 30 MG/100ML; MG/100ML; MG/100ML; MG/100ML; MG/100ML
INJECTION, SOLUTION INTRAVENOUS CONTINUOUS PRN
Status: DISCONTINUED | OUTPATIENT
Start: 2021-09-01 | End: 2021-09-01 | Stop reason: SURG

## 2021-09-01 RX ORDER — ALBUTEROL SULFATE 2.5 MG/3ML
2.5 SOLUTION RESPIRATORY (INHALATION) ONCE AS NEEDED
Status: DISCONTINUED | OUTPATIENT
Start: 2021-09-01 | End: 2021-09-01 | Stop reason: HOSPADM

## 2021-09-01 RX ORDER — MIDAZOLAM HYDROCHLORIDE 1 MG/ML
INJECTION INTRAMUSCULAR; INTRAVENOUS AS NEEDED
Status: DISCONTINUED | OUTPATIENT
Start: 2021-09-01 | End: 2021-09-01 | Stop reason: SURG

## 2021-09-01 RX ORDER — ONDANSETRON 2 MG/ML
INJECTION INTRAMUSCULAR; INTRAVENOUS AS NEEDED
Status: DISCONTINUED | OUTPATIENT
Start: 2021-09-01 | End: 2021-09-01 | Stop reason: SURG

## 2021-09-01 RX ORDER — MULTIVIT WITH MINERALS/LUTEIN
250 TABLET ORAL DAILY
COMMUNITY
End: 2023-03-30

## 2021-09-01 RX ORDER — METHYLERGONOVINE MALEATE 0.2 MG/ML
INJECTION INTRAVENOUS AS NEEDED
Status: DISCONTINUED | OUTPATIENT
Start: 2021-09-01 | End: 2021-09-01 | Stop reason: HOSPADM

## 2021-09-01 RX ORDER — DIPHENHYDRAMINE HYDROCHLORIDE 50 MG/ML
12.5 INJECTION INTRAMUSCULAR; INTRAVENOUS
Status: DISCONTINUED | OUTPATIENT
Start: 2021-09-01 | End: 2021-09-01 | Stop reason: HOSPADM

## 2021-09-01 RX ORDER — DEXAMETHASONE SODIUM PHOSPHATE 4 MG/ML
INJECTION, SOLUTION INTRA-ARTICULAR; INTRALESIONAL; INTRAMUSCULAR; INTRAVENOUS; SOFT TISSUE AS NEEDED
Status: DISCONTINUED | OUTPATIENT
Start: 2021-09-01 | End: 2021-09-01 | Stop reason: SURG

## 2021-09-01 RX ORDER — DIPHENHYDRAMINE HCL 25 MG
25 CAPSULE ORAL
Status: DISCONTINUED | OUTPATIENT
Start: 2021-09-01 | End: 2021-09-01 | Stop reason: HOSPADM

## 2021-09-01 RX ORDER — CITALOPRAM 10 MG/1
10 TABLET ORAL NIGHTLY
Status: DISCONTINUED | OUTPATIENT
Start: 2021-09-01 | End: 2021-09-02 | Stop reason: HOSPADM

## 2021-09-01 RX ORDER — SODIUM CHLORIDE, SODIUM LACTATE, POTASSIUM CHLORIDE, CALCIUM CHLORIDE 600; 310; 30; 20 MG/100ML; MG/100ML; MG/100ML; MG/100ML
125 INJECTION, SOLUTION INTRAVENOUS CONTINUOUS
Status: DISCONTINUED | OUTPATIENT
Start: 2021-09-01 | End: 2021-09-02 | Stop reason: HOSPADM

## 2021-09-01 RX ORDER — ACETAMINOPHEN 500 MG
1000 TABLET ORAL EVERY 6 HOURS
Status: DISCONTINUED | OUTPATIENT
Start: 2021-09-01 | End: 2021-09-02 | Stop reason: HOSPADM

## 2021-09-01 RX ORDER — DIPHENHYDRAMINE HYDROCHLORIDE 50 MG/ML
25 INJECTION INTRAMUSCULAR; INTRAVENOUS NIGHTLY PRN
Status: DISCONTINUED | OUTPATIENT
Start: 2021-09-01 | End: 2021-09-02 | Stop reason: HOSPADM

## 2021-09-01 RX ORDER — EPHEDRINE SULFATE 50 MG/ML
INJECTION, SOLUTION INTRAVENOUS AS NEEDED
Status: DISCONTINUED | OUTPATIENT
Start: 2021-09-01 | End: 2021-09-01 | Stop reason: SURG

## 2021-09-01 RX ORDER — KETOROLAC TROMETHAMINE 30 MG/ML
INJECTION, SOLUTION INTRAMUSCULAR; INTRAVENOUS AS NEEDED
Status: DISCONTINUED | OUTPATIENT
Start: 2021-09-01 | End: 2021-09-01 | Stop reason: SURG

## 2021-09-01 RX ORDER — ONDANSETRON 2 MG/ML
4 INJECTION INTRAMUSCULAR; INTRAVENOUS ONCE AS NEEDED
Status: COMPLETED | OUTPATIENT
Start: 2021-09-01 | End: 2021-09-01

## 2021-09-01 RX ORDER — DIPHENHYDRAMINE HCL 25 MG
25 CAPSULE ORAL NIGHTLY PRN
Status: DISCONTINUED | OUTPATIENT
Start: 2021-09-01 | End: 2021-09-02 | Stop reason: HOSPADM

## 2021-09-01 RX ORDER — MISOPROSTOL 200 UG/1
TABLET ORAL AS NEEDED
Status: DISCONTINUED | OUTPATIENT
Start: 2021-09-01 | End: 2021-09-01 | Stop reason: HOSPADM

## 2021-09-01 RX ORDER — ACETAMINOPHEN 325 MG/1
650 TABLET ORAL ONCE AS NEEDED
Status: DISCONTINUED | OUTPATIENT
Start: 2021-09-01 | End: 2021-09-01 | Stop reason: HOSPADM

## 2021-09-01 RX ORDER — SODIUM CHLORIDE 0.9 % (FLUSH) 0.9 %
10 SYRINGE (ML) INJECTION AS NEEDED
Status: DISCONTINUED | OUTPATIENT
Start: 2021-09-01 | End: 2021-09-01 | Stop reason: HOSPADM

## 2021-09-01 RX ORDER — KETOROLAC TROMETHAMINE 30 MG/ML
30 INJECTION, SOLUTION INTRAMUSCULAR; INTRAVENOUS ONCE
Status: COMPLETED | OUTPATIENT
Start: 2021-09-01 | End: 2021-09-01

## 2021-09-01 RX ORDER — DOXYCYCLINE 100 MG/1
100 CAPSULE ORAL ONCE
Status: COMPLETED | OUTPATIENT
Start: 2021-09-01 | End: 2021-09-01

## 2021-09-01 RX ADMIN — EPHEDRINE SULFATE 10 MG: 50 INJECTION INTRAVENOUS at 11:34

## 2021-09-01 RX ADMIN — PROPOFOL 50 MG: 10 INJECTION, EMULSION INTRAVENOUS at 11:01

## 2021-09-01 RX ADMIN — SODIUM CHLORIDE, POTASSIUM CHLORIDE, SODIUM LACTATE AND CALCIUM CHLORIDE 125 ML/HR: 600; 310; 30; 20 INJECTION, SOLUTION INTRAVENOUS at 13:21

## 2021-09-01 RX ADMIN — EPHEDRINE SULFATE 10 MG: 50 INJECTION INTRAVENOUS at 11:12

## 2021-09-01 RX ADMIN — SODIUM CHLORIDE, POTASSIUM CHLORIDE, SODIUM LACTATE AND CALCIUM CHLORIDE 125 ML/HR: 600; 310; 30; 20 INJECTION, SOLUTION INTRAVENOUS at 21:10

## 2021-09-01 RX ADMIN — TRAMADOL HYDROCHLORIDE 50 MG: 50 TABLET, FILM COATED ORAL at 19:59

## 2021-09-01 RX ADMIN — PHENYLEPHRINE HYDROCHLORIDE 100 MCG: 10 INJECTION INTRAVENOUS at 11:34

## 2021-09-01 RX ADMIN — LIDOCAINE HYDROCHLORIDE 60 MG: 20 INJECTION, SOLUTION INFILTRATION; PERINEURAL at 10:59

## 2021-09-01 RX ADMIN — ACETAMINOPHEN 1000 MG: 500 TABLET, FILM COATED ORAL at 19:14

## 2021-09-01 RX ADMIN — SODIUM CHLORIDE, SODIUM GLUCONATE, SODIUM ACETATE, POTASSIUM CHLORIDE AND MAGNESIUM CHLORIDE: 526; 502; 368; 37; 30 INJECTION, SOLUTION INTRAVENOUS at 11:46

## 2021-09-01 RX ADMIN — CITALOPRAM HYDROBROMIDE 10 MG: 10 TABLET ORAL at 22:12

## 2021-09-01 RX ADMIN — SODIUM CHLORIDE, POTASSIUM CHLORIDE, SODIUM LACTATE AND CALCIUM CHLORIDE 125 ML/HR: 600; 310; 30; 20 INJECTION, SOLUTION INTRAVENOUS at 08:53

## 2021-09-01 RX ADMIN — KETOROLAC TROMETHAMINE 30 MG: 30 INJECTION, SOLUTION INTRAMUSCULAR; INTRAVENOUS at 11:32

## 2021-09-01 RX ADMIN — ONDANSETRON 4 MG: 2 INJECTION INTRAMUSCULAR; INTRAVENOUS at 11:13

## 2021-09-01 RX ADMIN — ACETAMINOPHEN 1000 MG: 500 TABLET, FILM COATED ORAL at 14:41

## 2021-09-01 RX ADMIN — TRAMADOL HYDROCHLORIDE 25 MG: 50 TABLET, FILM COATED ORAL at 15:42

## 2021-09-01 RX ADMIN — FENTANYL CITRATE 50 MCG: 50 INJECTION INTRAMUSCULAR; INTRAVENOUS at 11:04

## 2021-09-01 RX ADMIN — PHENYLEPHRINE HYDROCHLORIDE 100 MCG: 10 INJECTION INTRAVENOUS at 11:44

## 2021-09-01 RX ADMIN — DOCUSATE SODIUM 100 MG: 100 CAPSULE, LIQUID FILLED ORAL at 21:55

## 2021-09-01 RX ADMIN — DOXYCYCLINE 100 MG: 100 CAPSULE ORAL at 08:53

## 2021-09-01 RX ADMIN — DEXAMETHASONE SODIUM PHOSPHATE 4 MG: 4 INJECTION, SOLUTION INTRAMUSCULAR; INTRAVENOUS at 11:11

## 2021-09-01 RX ADMIN — PROPOFOL 110 MG: 10 INJECTION, EMULSION INTRAVENOUS at 10:59

## 2021-09-01 RX ADMIN — KETOROLAC TROMETHAMINE 30 MG: 30 INJECTION, SOLUTION INTRAMUSCULAR at 17:39

## 2021-09-01 RX ADMIN — EPHEDRINE SULFATE 10 MG: 50 INJECTION INTRAVENOUS at 11:28

## 2021-09-01 RX ADMIN — ONDANSETRON 4 MG: 2 INJECTION INTRAMUSCULAR; INTRAVENOUS at 12:11

## 2021-09-01 RX ADMIN — MIDAZOLAM HYDROCHLORIDE 2 MG: 2 INJECTION, SOLUTION INTRAMUSCULAR; INTRAVENOUS at 10:52

## 2021-09-01 NOTE — OP NOTE
[facility]  Operative Report    Name: John Puga  MRN: [id number]  Date: 2021  CSN: [id number]      Location: [facility] Room #10    Service: Gynecology    Pre-op Diagnosis: Incomplete     Post-op Diagnosis: Same    Surgeon: Yesica Funk MD    Assistant: JACOBY Fox CSA Alyssa Hounshell, MS3    Staff:  Circulator: Lissett Love RN; Victorina Alva RN  Scrub Person: Candy Mijares; Jose Alfredo Borden; Ame Reid  Assistant: Tanya Spring CSA; Gisselle Syed MA    Anesthesia: General ETT    Anesthesia Staff:  CRNA: Carrie Ovalle CRNA    Operation: Suction dilation and curettage    Drains: None    Complications: None    Findings: Normal appearing abdomen.  Cervix normal other than ectropion and small cervical polyp noted.  On ultrasound, no fetal pole identified and there was a large gestational sac with placenta.  Endometrial stripe at the end of the procedure was thin.    Condition: Stable    Specimens/Disposition: Products of conception    Estimated Blood Loss: 1500 mL  IV Fluids: 1300 mL crystalloid  Urine Output: 100 mL    Indications: John Puga, 40 y.o.,  with incomplete  after attempting medical management with Cytotec x3 doses after expectant management for 1 month prior.  She was agreeable to surgical management.    Description of Operation:  The patient was identified and the procedure verified and was then taken to the OR where general endotracheal anesthesia was taken place.  The patient was placed in the dorsal lithotomy position with Yellofin stirrups.  An exam under anesthesia was performed with findings noted above.  She was prepped and draped in the normal, sterile fashion.  A timeout was then performed.  The bladder was drained in usual fashion.  A bivalve speculum was placed in the vagina.  An Allis was used to grasp the anterior lip of the cervix.  The cervix was then dilated with Damon  dilator to #23.  Procedure was performed under ultrasound guidance.  A #8 Azerbaijani suction catheter was introduced into the uterine cavity and advanced to the uterine fundus.  Upon entry of the suction catheter into the uterus, blood poured out.  The suction was then started but minimal tissue was obtained.  The catheter was switched to a #10 Azerbaijani suction catheter.  Moderate amount of tissue obtained.  The products of conception were evacuated with the curette rotating.  A gentle, sharp curettage was then performed with a large curette.  The suction curette was then reintroduced to clear the uterus.  The Allis was removed from the cervix and hemostasis was noted.  The vagina was inspected and found not to contain any instruments or sponges.  She continued to have some bleeding so fundal massage was performed.  She was given IM Methergine 0.2mg, rectal Cytotec 1000mcg, and IM Hemabate 250mcg.  H&H obtained.  With monitoring, her bleeding improved.   Sponge and instrument counts were correct.  The patient tolerated the procedure well.  She was extubated and escorted to the recovery room in stable condition.  She did receive 1 unit of pRBCs in PACU.    The patient received PO doxycycline 100mg for antibiotic prophylaxis prior to the start of the procedure.    Tanya Spring, JACOBY CSFA and Gisselle Syed CSA were responsible for performing the following activities: Ultrasound and their skilled assistance was necessary for the success of this case.    This document has been electronically signed by Yesica Funk MD on September 1, 2021 12:13 CDT.

## 2021-09-01 NOTE — INTERVAL H&P NOTE
"H&P reviewed. The patient was examined and there are no changes to the H&P. No concerns.    BP 94/54   Pulse 63   Temp 97.6 °F (36.4 °C) (Temporal)   Resp 18   Ht 156.2 cm (61.5\")   Wt 55.5 kg (122 lb 5.7 oz)   LMP 2021   SpO2 98%   BMI 22.74 kg/m²   Gen: NAD, AAO x3    A/P: John Puga is 40 y.o.  with incomplete .  Proceed with suction D&C.  Risks previously discussed.    This document has been electronically signed by Yesica Funk MD on 2021 10:16 CDT.  "

## 2021-09-01 NOTE — NURSING NOTE
Patient sat on side of bed. Patient states she feels dizzy and nauseated. Patient would not attempt to go to the bathroom states she is just too weak to try. Patient placed in lying position. bp 88/54 heart rate 93 O2 sat 97%. Dr.Sarah Funk made aware. Orders to straight cath patient, transfuse 1 unit PRBC, decrease iv fluids to 75cc/hr. Plans to admit patient.

## 2021-09-01 NOTE — ANESTHESIA PROCEDURE NOTES
Airway  Urgency: elective    Date/Time: 9/1/2021 11:01 AM  Airway not difficult    General Information and Staff    CRNA: Carrie Ovalle, ALBIN    Indications and Patient Condition  Indications for airway management: airway protection    Preoxygenated: yes  Mask difficulty assessment: 0 - not attempted    Final Airway Details  Final airway type: supraglottic airway      Successful airway: I-gel  Size 3    Number of attempts at approach: 2  Assessment: lips, teeth, and gum same as pre-op and atraumatic intubation    Additional Comments  I-gel 4 too big

## 2021-09-01 NOTE — ANESTHESIA PREPROCEDURE EVALUATION
Anesthesia Evaluation     Patient summary reviewed and Nursing notes reviewed   no history of anesthetic complications:  NPO Solid Status: > 8 hours  NPO Liquid Status: > 2 hours           Airway   Mallampati: I  TM distance: >3 FB  Neck ROM: full  No difficulty expected  Dental - normal exam     Pulmonary - normal exam    breath sounds clear to auscultation  (-) asthma, rhonchi, decreased breath sounds, wheezes, not a smoker  Cardiovascular   Exercise tolerance: good (4-7 METS)    Rhythm: regular  Rate: normal    (-) hypertension, valvular problems/murmurs, past MI, dysrhythmias, murmur, DVT      Neuro/Psych  (-) seizures  GI/Hepatic/Renal/Endo    (+)   thyroid problem hypothyroidism  (-)  obesity, GERD    Musculoskeletal (-) negative ROS    Abdominal  - normal exam   Substance History - negative use     OB/GYN    (+) Pregnant,         Other - negative ROS       ROS/Med Hx Other: Incomplete     Hgb=12.8                  Anesthesia Plan    ASA 2     general     intravenous induction     Anesthetic plan, all risks, benefits, and alternatives have been provided, discussed and informed consent has been obtained with: patient.  Use of blood products discussed with patient .   Plan discussed with CRNA.

## 2021-09-02 VITALS
OXYGEN SATURATION: 97 % | RESPIRATION RATE: 18 BRPM | SYSTOLIC BLOOD PRESSURE: 102 MMHG | TEMPERATURE: 98.1 F | HEIGHT: 62 IN | HEART RATE: 85 BPM | BODY MASS INDEX: 22.52 KG/M2 | DIASTOLIC BLOOD PRESSURE: 51 MMHG | WEIGHT: 122.36 LBS

## 2021-09-02 LAB
DEPRECATED RDW RBC AUTO: 41.7 FL (ref 37–54)
ERYTHROCYTE [DISTWIDTH] IN BLOOD BY AUTOMATED COUNT: 13.5 % (ref 12.3–15.4)
HCT VFR BLD AUTO: 23.6 % (ref 34–46.6)
HGB BLD-MCNC: 8.2 G/DL (ref 12–15.9)
MCH RBC QN AUTO: 29.6 PG (ref 26.6–33)
MCHC RBC AUTO-ENTMCNC: 34.7 G/DL (ref 31.5–35.7)
MCV RBC AUTO: 85.2 FL (ref 79–97)
PLATELET # BLD AUTO: 110 10*3/MM3 (ref 140–450)
PMV BLD AUTO: 11.7 FL (ref 6–12)
RBC # BLD AUTO: 2.77 10*6/MM3 (ref 3.77–5.28)
WBC # BLD AUTO: 10.06 10*3/MM3 (ref 3.4–10.8)

## 2021-09-02 PROCEDURE — 94799 UNLISTED PULMONARY SVC/PX: CPT

## 2021-09-02 PROCEDURE — 85027 COMPLETE CBC AUTOMATED: CPT | Performed by: OBSTETRICS & GYNECOLOGY

## 2021-09-02 RX ADMIN — ACETAMINOPHEN 1000 MG: 500 TABLET, FILM COATED ORAL at 01:23

## 2021-09-02 RX ADMIN — SODIUM CHLORIDE, POTASSIUM CHLORIDE, SODIUM LACTATE AND CALCIUM CHLORIDE 125 ML/HR: 600; 310; 30; 20 INJECTION, SOLUTION INTRAVENOUS at 04:33

## 2021-09-02 RX ADMIN — DOCUSATE SODIUM 100 MG: 100 CAPSULE, LIQUID FILLED ORAL at 09:55

## 2021-09-02 RX ADMIN — IBUPROFEN 800 MG: 800 TABLET, FILM COATED ORAL at 09:54

## 2021-09-02 RX ADMIN — IBUPROFEN 800 MG: 800 TABLET, FILM COATED ORAL at 00:18

## 2021-09-02 RX ADMIN — ACETAMINOPHEN 1000 MG: 500 TABLET, FILM COATED ORAL at 07:32

## 2021-09-02 NOTE — PLAN OF CARE
Goal Outcome Evaluation:  Plan of Care Reviewed With: patient        Progress: improving  Outcome Summary: VSS, given 1 unit of blood in pacu and 1 unit of blood in mb unit. ambulated to the bathroom and voids, pain controlled with meds.

## 2021-09-02 NOTE — DISCHARGE SUMMARY
[facility]  Discharge Summary  Patient Name: John Puga  : 1980  MRN: [id number]  [facility]: [id number]    Discharge Summary    Date of Admission: 2021   Date of Discharge: 2021     Discharge Diagnosis: Active Hospital Problems    Diagnosis  POA   • **Incomplete  [O03.4]  Yes     Added automatically from request for surgery [id number]     • Acute blood loss anemia [D62]  Yes      Procedures Performed: Procedure(s):  Suction dilation and curettage      Brief History: Patient is a 40 y.o.  who presented for suction D&C.   Hospital Course: Patient was admitted following surgery due to acute blood loss anemia and to receive a 2nd unit of pRBCs.  Her hospital course has been uneventful.  Today, on postoperative day #1, she is tolerating a regular diet, ambulating without assistance, and desires to go home.   Pierre catheter was removed this morning, and she has urinated without difficulty.  She has had no fever, and her pain is controlled.   She has had no nausea or vomiting.  Discharge instructions were reviewed and return precautions given.   Pending Studies: Tissue pathology   Condition at Discharge: Stable   Discharge Diet: Diet Instructions     Advance Diet as Tolerated      Diet:      Diet Texture / Consistency: Regular    Diet: Regular      Discharge Diet: Regular         Discharge Activity: Activity Instructions     Activity as Tolerated      Discharge Activity      1) No driving until 24 hours after surgery.   2) Return to school / work as tolerated or in 1-2 weeks.  3) May shower.  No baths for 2 weeks.  4) Do not lift / push / pull more then 25 lbs for 1 week.  5) Nothing inside the vagina including tampons, intercourse, or douching for 2 weeks.  6) Bleeding that is light to a normal period is to be expected; if it is heavier or if you feel lightheaded, dizzy, short of breath, or pass out, you should call the office ([phone]) or go to the Emergency Room.    Other  Activity Restrictions      No driving until tomorrow.  Riding is car is fine.  No lifting more than 25 lbs for 1 week.  No soaking in bathtub for 2 weeks, showers are fine.  Nothing in vagina including tampons/sexual intercourse for 2 weeks.  May return to work/school as tolerated.    Type of Restriction: Other    Explain Other Restrictions: see below         Discharge Medications:    Your medication list      START taking these medications      Instructions Last Dose Given Next Dose Due   acetaminophen 500 MG tablet  Commonly known as: TYLENOL      Take 2 tablets by mouth Every 6 (Six) Hours As Needed for Mild Pain .       ibuprofen 800 MG tablet  Commonly known as: ADVIL,MOTRIN      Take 1 tablet by mouth Every 8 (Eight) Hours As Needed for Moderate Pain .          CONTINUE taking these medications      Instructions Last Dose Given Next Dose Due   [place] Thyroid 30 MG tablet  Generic drug: Thyroid      Take 30 mg by mouth Every Morning. 2 tabs       citalopram 10 MG tablet  Commonly known as: CeleXA      Take 10 mg by mouth Every Night.       multivitamin with minerals tablet tablet      Take 4 tablets by mouth Daily.       Progesterone 200 MG capsule  Commonly known as: PROMETRIUM      Take 200 mg by mouth Daily.       vitamin C 250 MG tablet  Commonly known as: ASCORBIC ACID      Take 250 mg by mouth Daily.             Where to Get Your Medications      These medications were sent to [facility] - [place], [place] - [address] [place] [address] - [phone]  - [phone] [address] [address]    Phone: [phone] ·   acetaminophen 500 MG tablet  · ibuprofen 800 MG tablet        Discharge Disposition: Home   Follow-up: Future Appointments   Date Time Provider Department Center   9/14/2021 11:15 AM Yesica Funk MD [facility] None      <30 minutes was spent with the patient on the day of discharge.    This document has been electronically signed by Yesica Funk MD on  September 2, 2021 08:05 CDT.

## 2021-09-02 NOTE — DISCHARGE INSTRUCTIONS
Notify Dr of... heavy bleeding, passing clots, foul odor to your discharge, temperature above 100.4, burning when urinating, gapping or drainage from incision, or for pain not relieved by taking pain medication, pain or redness in legs.    Take all medications as prescribed.  Continue taking medication as instructed  Take rest periods several times during the day.  Pelvic rest for 6 weeks. No douching, tampons, or intercourse  No driving for 2 weeks and when taking narcotics  No lifting anything over 10lbs

## 2021-09-02 NOTE — PLAN OF CARE
Goal Outcome Evaluation:  Plan of Care Reviewed With: patient        Progress: improving  Outcome Summary: DC Home

## 2021-09-02 NOTE — PROGRESS NOTES
"PROGRESS NOTE - Gynecology    Name: John Puga  MRN: [id number]  Location: [facility]765/1  Date: 2021  Time: 06:36 CDT     POD #1 s/p D&C with suction secondary to incomplete , EBL 1500 mL    SUBJECTIVE  Patient seen and examined.  Pain controlled. Tolerating diet.  Denies nausea or vomiting.  Denies flatus or BM.  Voiding spontaneously.Denies chest pain, shortness of breath, fevers, chills, or leg pain. Pt c/o generalized weakness due to blood loss and dizziness upon standing. Pt states she feels much better today compared to yesterday but still feels very tired. Pt nurse Naresh Espinal RN reports pt has had minimal bleeding in her pads and was able to walk to the toilet by herself last night.     OBJECTIVE  BP 99/54 (BP Location: Left arm, Patient Position: Lying)   Pulse 77   Temp 98.3 °F (36.8 °C) (Oral)   Resp 16   Ht 156.2 cm (61.5\")   Wt 55.5 kg (122 lb 5.7 oz)   SpO2 99%   BMI 22.74 kg/m²   Gen: NAD, AAO x3  CV: RRR  Lungs: CTAB  Abd: Soft, NTND, +BS.  Ext: No edema, +pedal pulses bilaterally.      Intake/Output Summary (Last 24 hours) at 2021 0636  Last data filed at 2021 0123  Gross per 24 hour   Intake 2741 ml   Output 2350 ml   Net 391 ml         LABS  Labs were reviewed as indicated below.  Hgb 8.5 > 8.2    IMPRESSION  John Puga is a 40 y.o.  POD #1 s/p suction D&C secondary to incomplete .  - Pt is improving, continue to monitor blood loss and BP.     PLAN  1.  Postoperative management  - VS: Afebrile, normotensive, nontachycardic  - Urine output: mL   - IVF: Plasmalyte 125 mL/hr    - Lines: Peripheral IV  - Diet: Normal  - Medications:   - Pain regimen: Tylenol, Motrin scheduled. Ultram PRN   - Nausea regimen: Zofran PRN    - GI regimen: Pepcid   - Bowel regimen: Colace    - Anticoagulation: Encourage ambulation  - Activity: OOB, ambulation encouraged  - Respiratory prophylaxis: Incentive spirometry encouraged  - DVT prophylaxis: ambulation  - Labs: " "CBC, CMP in AM  - Dispo: Home later today    This document has been electronically signed by Abbie Mehta, Medical Student on 2021 06:36 CDT.    Attending Attestation  As the teaching physician, I have personally re-performed the physical exam and medical decision making activities included in the student note.    Patient seen and examined.  She states that she feels much better.  Dizziness improved.  Minimal bleeding.  Voiding spontaneously.    BP 99/54 (BP Location: Left arm, Patient Position: Lying)   Pulse 77   Temp 98.3 °F (36.8 °C) (Oral)   Resp 16   Ht 156.2 cm (61.5\")   Wt 55.5 kg (122 lb 5.7 oz)   SpO2 99%   BMI 22.74 kg/m²   Gen: NAD, AAO x3  Abd: Soft, NTND, uterus nontender and FFBU    A/P: John Puga is a 40 y.o.  POD #1 s/p suction D&C secondary to incomplete , complicated by acute blood loss anemia.  Received 2 units pRBCs, improved symptoms.  Stable for discharge.    This document has been electronically signed by Yesica Funk MD on 2021 08:02 CDT.  "

## 2021-09-03 LAB
BH BB BLOOD EXPIRATION DATE: NORMAL
BH BB BLOOD EXPIRATION DATE: NORMAL
BH BB BLOOD TYPE BARCODE: NORMAL
BH BB BLOOD TYPE BARCODE: NORMAL
BH BB DISPENSE STATUS: NORMAL
BH BB DISPENSE STATUS: NORMAL
BH BB PRODUCT CODE: NORMAL
BH BB PRODUCT CODE: NORMAL
BH BB UNIT NUMBER: NORMAL
BH BB UNIT NUMBER: NORMAL
CROSSMATCH INTERPRETATION: NORMAL
CROSSMATCH INTERPRETATION: NORMAL
LAB AP CASE REPORT: NORMAL
PATH REPORT.FINAL DX SPEC: NORMAL
UNIT  ABO: NORMAL
UNIT  ABO: NORMAL
UNIT  RH: NORMAL
UNIT  RH: NORMAL

## 2021-09-14 ENCOUNTER — OFFICE VISIT (OUTPATIENT)
Dept: OBSTETRICS AND GYNECOLOGY | Facility: CLINIC | Age: 41
End: 2021-09-14

## 2021-09-14 DIAGNOSIS — O03.4 INCOMPLETE ABORTION: ICD-10-CM

## 2021-09-14 DIAGNOSIS — D62 ACUTE BLOOD LOSS ANEMIA: ICD-10-CM

## 2021-09-14 DIAGNOSIS — Z09 POSTOPERATIVE FOLLOW-UP: Primary | ICD-10-CM

## 2021-09-14 PROCEDURE — 99441 PR PHYS/QHP TELEPHONE EVALUATION 5-10 MIN: CPT | Performed by: OBSTETRICS & GYNECOLOGY

## 2021-09-14 NOTE — PROGRESS NOTES
[facility]  Gynecology  Post-operative Visit    DOS: 2021  Pre-op diagnosis: Incomplete   Procedure: Suction D&C  Pathology:   Products of conception: Degenerating chorionic villi and decidualized tissue. Findings compatible with products of conception.    Patient presents for post-op visit.  Post-operative course complicated by ABLA requiring 2 units of pRBCs.  She states that she is doing well.  She states that she is tired and fatigue, but improved.  She is having spotting, but minimal.    A/P: John Puga is a 40 y.o.  s/p suction D&C on .  Doing well.  - Reviewed pathology  - Discussed that if she would like to try to conceive again, she can try after her next period  - Restrictions lifted    This document has been electronically signed by Yesica Funk MD on 2021 11:00 CDT.    This visit has been rescheduled as a phone visit to comply with patient safety concerns in accordance with CDC recommendations.  Total time of discussion was 8 minutes.  The use of a telephone visit has been reviewed with the patient and verbal informed consent has been obtained.

## 2022-09-13 ENCOUNTER — INITIAL PRENATAL (OUTPATIENT)
Dept: OBSTETRICS AND GYNECOLOGY | Facility: CLINIC | Age: 42
End: 2022-09-13

## 2022-09-13 ENCOUNTER — LAB (OUTPATIENT)
Dept: LAB | Facility: HOSPITAL | Age: 42
End: 2022-09-13

## 2022-09-13 VITALS — BODY MASS INDEX: 22.86 KG/M2 | DIASTOLIC BLOOD PRESSURE: 62 MMHG | WEIGHT: 123 LBS | SYSTOLIC BLOOD PRESSURE: 100 MMHG

## 2022-09-13 VITALS — WEIGHT: 123.8 LBS | DIASTOLIC BLOOD PRESSURE: 62 MMHG | SYSTOLIC BLOOD PRESSURE: 100 MMHG | BODY MASS INDEX: 23.01 KG/M2

## 2022-09-13 DIAGNOSIS — O09.529 ANTEPARTUM MULTIGRAVIDA OF ADVANCED MATERNAL AGE: Primary | ICD-10-CM

## 2022-09-13 DIAGNOSIS — O26.891 RH NEGATIVE STATUS DURING PREGNANCY IN FIRST TRIMESTER: Primary | ICD-10-CM

## 2022-09-13 DIAGNOSIS — O26.879 SHORT CERVIX AFFECTING PREGNANCY: ICD-10-CM

## 2022-09-13 DIAGNOSIS — O36.80X0 ENCOUNTER TO DETERMINE FETAL VIABILITY OF PREGNANCY, SINGLE OR UNSPECIFIED FETUS: ICD-10-CM

## 2022-09-13 DIAGNOSIS — Z36.87 UNSURE OF LMP (LAST MENSTRUAL PERIOD) AS REASON FOR ULTRASOUND SCAN: ICD-10-CM

## 2022-09-13 DIAGNOSIS — Z67.91 RH NEGATIVE STATUS DURING PREGNANCY IN FIRST TRIMESTER: Primary | ICD-10-CM

## 2022-09-13 DIAGNOSIS — Z87.59 HISTORY OF MISCARRIAGE: ICD-10-CM

## 2022-09-13 DIAGNOSIS — Z3A.12 12 WEEKS GESTATION OF PREGNANCY: ICD-10-CM

## 2022-09-13 DIAGNOSIS — O09.521 MULTIGRAVIDA OF ADVANCED MATERNAL AGE IN FIRST TRIMESTER: ICD-10-CM

## 2022-09-13 DIAGNOSIS — O34.219 H/O SUCCESSFUL VAGINAL BIRTH AFTER CESAREAN, CURRENTLY PREGNANT: ICD-10-CM

## 2022-09-13 DIAGNOSIS — N96 HISTORY OF RECURRENT MISCARRIAGES: ICD-10-CM

## 2022-09-13 LAB
AMPHET+METHAMPHET UR QL: NEGATIVE
AMPHETAMINES UR QL: NEGATIVE
BARBITURATES UR QL SCN: NEGATIVE
BENZODIAZ UR QL SCN: NEGATIVE
BILIRUB UR QL STRIP: NEGATIVE
BLD GP AB SCN SERPL QL: NEGATIVE
BUPRENORPHINE SERPL-MCNC: NEGATIVE NG/ML
CANNABINOIDS SERPL QL: NEGATIVE
CLARITY UR: CLEAR
COCAINE UR QL: NEGATIVE
COLOR UR: YELLOW
DEPRECATED RDW RBC AUTO: 44.8 FL (ref 37–54)
ERYTHROCYTE [DISTWIDTH] IN BLOOD BY AUTOMATED COUNT: 13.4 % (ref 12.3–15.4)
GLUCOSE UR STRIP-MCNC: NEGATIVE MG/DL
HBV SURFACE AG SERPL QL IA: NORMAL
HCT VFR BLD AUTO: 40.9 % (ref 34–46.6)
HGB BLD-MCNC: 13.8 G/DL (ref 12–15.9)
HGB UR QL STRIP.AUTO: NEGATIVE
KETONES UR QL STRIP: NEGATIVE
LEUKOCYTE ESTERASE UR QL STRIP.AUTO: NEGATIVE
MCH RBC QN AUTO: 31 PG (ref 26.6–33)
MCHC RBC AUTO-ENTMCNC: 33.7 G/DL (ref 31.5–35.7)
MCV RBC AUTO: 91.9 FL (ref 79–97)
METHADONE UR QL SCN: NEGATIVE
NITRITE UR QL STRIP: NEGATIVE
OPIATES UR QL: NEGATIVE
OXYCODONE UR QL SCN: NEGATIVE
PCP UR QL SCN: NEGATIVE
PH UR STRIP.AUTO: 7.5 [PH] (ref 5–8)
PLATELET # BLD AUTO: 147 10*3/MM3 (ref 140–450)
PMV BLD AUTO: 12.4 FL (ref 6–12)
PROPOXYPH UR QL: NEGATIVE
PROT UR QL STRIP: NEGATIVE
RBC # BLD AUTO: 4.45 10*6/MM3 (ref 3.77–5.28)
SP GR UR STRIP: 1.02 (ref 1–1.03)
TRICYCLICS UR QL SCN: NEGATIVE
UROBILINOGEN UR QL STRIP: NORMAL
WBC NRBC COR # BLD: 9.47 10*3/MM3 (ref 3.4–10.8)

## 2022-09-13 PROCEDURE — 81003 URINALYSIS AUTO W/O SCOPE: CPT | Performed by: NURSE PRACTITIONER

## 2022-09-13 PROCEDURE — 36415 COLL VENOUS BLD VENIPUNCTURE: CPT

## 2022-09-13 PROCEDURE — 80306 DRUG TEST PRSMV INSTRMNT: CPT | Performed by: NURSE PRACTITIONER

## 2022-09-13 PROCEDURE — 86850 RBC ANTIBODY SCREEN: CPT | Performed by: NURSE PRACTITIONER

## 2022-09-13 PROCEDURE — 86762 RUBELLA ANTIBODY: CPT | Performed by: NURSE PRACTITIONER

## 2022-09-13 PROCEDURE — 99214 OFFICE O/P EST MOD 30 MIN: CPT | Performed by: NURSE PRACTITIONER

## 2022-09-13 PROCEDURE — 87340 HEPATITIS B SURFACE AG IA: CPT | Performed by: NURSE PRACTITIONER

## 2022-09-13 PROCEDURE — 96372 THER/PROPH/DIAG INJ SC/IM: CPT | Performed by: NURSE PRACTITIONER

## 2022-09-13 PROCEDURE — 85027 COMPLETE CBC AUTOMATED: CPT | Performed by: NURSE PRACTITIONER

## 2022-09-13 RX ORDER — ESTRADIOL 2 MG/1
2 TABLET ORAL DAILY
Status: ON HOLD | COMMUNITY
End: 2023-03-09

## 2022-09-13 RX ORDER — PROGESTERONE 200 MG/1
200 CAPSULE ORAL DAILY
COMMUNITY
End: 2023-03-30

## 2022-09-13 RX ORDER — PRENATAL VIT NO.126/IRON/FOLIC 28MG-0.8MG
TABLET ORAL DAILY
COMMUNITY

## 2022-09-13 NOTE — PROGRESS NOTES
[facility]  Obstetrics  Date of Service: 2022    CHIEF COMPLAINT:  New prenatal visit    HISTORY OF PRESENT ILLNESS:  John Puga is a 41 y.o. y/o  at 12w5d by LMP (Patient's last menstrual period was 2022 (approximate).).  This was a planned pregnancy and the patient is supported by her , Bandar.  Reports no nausea without vomiting.  Reports breast tenderness.  She denies any vaginal bleeding.  She has started taking a prenatal vitamin.    REVIEW OF SYSTEMS  Review of Systems   Constitutional: Negative for activity change, appetite change, diaphoresis, fatigue, unexpected weight gain and unexpected weight loss.   Respiratory: Negative for chest tightness and shortness of breath.    Cardiovascular: Negative for chest pain and palpitations.   Gastrointestinal: Negative for abdominal distention, abdominal pain, constipation, diarrhea, nausea and vomiting.   Genitourinary: Negative for amenorrhea, breast discharge, breast lump, breast pain, decreased libido, decreased urine volume, dyspareunia, dysuria, menstrual problem, pelvic pain, pelvic pressure, urgency, vaginal bleeding, vaginal discharge and vaginal pain.        Has had several weeks of vaginal spotting. Stopped about 1-2 weeks ago but has not yet had a Rhogam injection.   Musculoskeletal: Negative for myalgias.   Skin: Negative for color change, dry skin and skin lesions.   Neurological: Negative for light-headedness and headache.   Psychiatric/Behavioral: Negative for agitation, dysphoric mood, sleep disturbance, depressed mood and stress. The patient is not nervous/anxious.        PRENATAL RISK FACTORS   Problems (from 22 to present)     Problem Noted Resolved    Multigravida of advanced maternal age in first trimester 2022 by Veronica Nuno, APRN No    History of recurrent miscarriages 2022 by Veronica Nuno, APRN No    Short cervix affecting pregnancy 2022 by Veronica Nuno,  KARMA No    Rh negative status during pregnancy in first trimester 2022 by Veronica Nuno APRN No    H/O successful vaginal birth after , currently pregnant 2022 by Veronica Nuno APRN No    Overview Signed 2022 10:00 AM by Veronica Nuno APRN     G1: PLTCS 2/2 breech presentation.  Successful vaginal deliveries following x3               DATING CRITERIA:  LMP (22) -- PRABHU 3/23/23 FINAL  1TUS (22 at 13w0d) -- PRABHU 3/21/23    OBSTETRIC HISTORY:  OB History    Para Term  AB Living   9 4 4   4 4   SAB IAB Ectopic Molar Multiple Live Births   4         4      # Outcome Date GA Lbr Tani/2nd Weight Sex Delivery Anes PTL Lv   9 Current            8 SAB 21     SAB         Birth Comments: D&C for incomplete Ab   7 Term 16 39w6d  3572 g (7 lb 14 oz) M    FABIO      Birth Comments: 8cm on admission      Complications: AMA (advanced maternal age) multigravida 35+, Thrombocytopenia affecting pregnancy (HCC), History of , Positive GBS test   6 Term 13 41w3d  3374 g (7 lb 7 oz) M    FABIO      Birth Comments: 8cm in office; had SROM; rechecked and found to be complete; pushed in wheelchair to L&D      Complications: Breech presentation   5 Term 11 40w1d  2920 g (6 lb 7 oz) F Vag-Forceps   FABIO      Birth Comments: IOL      Complications: Positive GBS test   4 Term 09 38w0d  3147 g (6 lb 15 oz) F CS-LTranv Spinal  FABIO      Complications: PROM (premature rupture of membranes), Breech presentation   3 2005     SAB      2 2002     SAB      1 SAB      SAB        GYN HISTORY:  Denies h/o sexually transmitted infections/pelvic inflammatory disease  Denies h/o abnormal pap smears  Last pap smear:  NIL neg hrHPV  Last Completed Pap Smear          PAP SMEAR (Every 3 Years) Next due on 3/9/2024    2021  Liquid-based Pap Smear, Screening    2014  Converted (Historical) Gyn Cytology    2012  Converted (Historical) Gyn Cytology     2010  Converted (Historical) Gyn Cytology    2009  Converted (Historical) Gyn Cytology              Denies h/o gynecologic surgeries, including biopsies of the cervix    PAST MEDICAL HISTORY:  Past Medical History:   Diagnosis Date   • Acquired hypothyroidism     new dx   • Blood type, Rh negative    • Depression    • History of transfusion    • OCD (obsessive compulsive disorder)    • Thrombocytopenia (HCC)    • Urge incontinence      PAST SURGICAL HISTORY:  Past Surgical History:   Procedure Laterality Date   •  SECTION  2011   • D & C WITH SUCTION N/A 2021    Procedure: Suction dilation and curettage;  Surgeon: Yesica Funk MD;  Location: [facility];  Service: Gynecology;  Laterality: N/A;     FAMILY HISTORY:  Family History   Problem Relation Age of Onset   • No Known Problems Father    • Other Mother         possible mesothelioma   • Lung cancer Mother    • No Known Problems Brother    • No Known Problems Brother    • No Known Problems Sister    • No Known Problems Son    • No Known Problems Son    • No Known Problems Daughter    • No Known Problems Daughter    • Kidney failure Paternal Grandfather    • Hypertension Paternal Grandmother    • Birth defects Neg Hx    • Breast cancer Neg Hx    • Ovarian cancer Neg Hx    • Endometrial cancer Neg Hx    • Colon cancer Neg Hx    • Diabetes Neg Hx      SOCIAL HISTORY:  Social History     Socioeconomic History   • Marital status:    Tobacco Use   • Smoking status: Never Smoker   • Smokeless tobacco: Never Used   Vaping Use   • Vaping Use: Never used   Substance and Sexual Activity   • Alcohol use: No   • Drug use: No   • Sexual activity: Yes     Partners: Male     Comment: last pap smear negative with HPV negative on 3/9/21     GENETIC SCREENING:  Age >36 yo as of PRABHU: YES  Thalassemia: no  NTD: no  CHD: no  Down Syndrome/MR/Fragile X/Autism: no  Ashkenazi Presybeterian with Zia-Sachs, [place], familial dysautonomia:  no  Sickle cell disease or trait: no  Hemophilia: no  Muscular dystrophy: no  Cystic fibrosis: no  Chichester's chorea: no  Birth defects: no  Genetic/chromosomal disorders: FOB's niece with Trisomy 13    INFECTION HISTORY:  TB exposure: no  HSV: no  Illness since LMP: no  Prior GBS infected child: no  STIs: no    ALLERGIES:  Allergies   Allergen Reactions   • Latex Rash       MEDICATIONS:  Prior to Admission medications    Medication Sig Start Date End Date Taking? Authorizing Provider   Alpha-Lipoic Acid (LIPOIC ACID PO) Take  by mouth.   Yes Jun Horner MD [place] Thyroid 30 MG tablet Take 30 mg by mouth Every Morning. 2 tabs 7/12/21  Yes Jun Horner MD   Chaste Tree (VITEX EXTRACT PO) Take  by mouth.   Yes Jun Horner MD   citalopram (CeleXA) 10 MG tablet Take 10 mg by mouth Every Night.   Yes Jun Horner MD   conjugated estrogens (PREMARIN) 0.625 MG/GM vaginal cream Insert  into the vagina Daily.   Yes Jun Horner MD   Cyanocobalamin (VITAMIN B-12 PO) Take  by mouth.   Yes Jun Horner MD   estradiol (ESTRACE) 2 MG tablet Take 2 mg by mouth Daily.   Yes Jun Horner MD   Folic Acid (FOLATE PO) Take  by mouth.   Yes Jun Horner MD   Misc Natural Products (ADRENAL PO) Take  by mouth.   Yes Jun Horner MD   Omega 3-6-9 Fatty Acids (OMEGA-3-6-9 PO) Take  by mouth.   Yes Jun Horner MD   prenatal vitamin (prenatal, CLASSIC, vitamin) tablet Take  by mouth Daily.   Yes Jun Horner MD   Progesterone (PROMETRIUM) 200 MG capsule Take 200 mg by mouth Daily.   Yes Jun Horner MD   vitamin C (ASCORBIC ACID) 250 MG tablet Take 250 mg by mouth Daily.   Yes Jun Horner MD   VITAMIN D, CHOLECALCIFEROL, PO Take  by mouth.   Yes Jun Horner MD   acetaminophen (TYLENOL) 500 MG tablet Take 2 tablets by mouth Every 6 (Six) Hours As Needed for Mild Pain . 9/1/21 9/13/22  Yesica Funk  MD [place]   ibuprofen (ADVIL,MOTRIN) 800 MG tablet Take 1 tablet by mouth Every 8 (Eight) Hours As Needed for Moderate Pain . 21  Yesica Funk MD   multivitamin with minerals (MULTIVITAMIN ADULT PO) Take 4 tablets by mouth Daily.  22  Provider, MD Jun       PHYSICAL EXAM:   /62   Wt 55.8 kg (123 lb)   LMP 2022 (Approximate)   BMI 22.86 kg/m²   General: Alert, healthy, no distress, well nourished and well developed.  Neurologic: Alert, oriented to person, place, and time.  Gait normal.  Cranial nerves II-XII grossly intact.  HEENT: Normocephalic, atraumatic.  Extraocular muscles intact, pupils equal and reactive x2.    Teeth: Normal hygiene.  Neck: Supple, no adenopathy, thyroid normal size, non-tender, without nodularity, trachea midline.  Breasts: Deferred  Lungs: Normal respiratory effort.  Clear to auscultation bilaterally.  No wheezes, rhonci, or rales.  Heart: Regular rate and rhythm.  No murmer, rub or gallop.  Abdomen: Soft, non-tender, non-distended,no masses, no hepatosplenomegaly, no hernia.  Skin: No rash, no lesions.  Extremities: No cyanosis, clubbing or edema.  PELVIC EXAM:  Deferred    IMPRESSION:  John Puga is a 41 y.o.  at 12w5d for a new prenatal visit.    PLAN:  1.  IUP at 12w5d  - Options counseling performed and patient desires continuation of pregnancy to term   - Prenatal labs ordered  - Genetic testing, including cystic fibrosis, was discussed and patient considering  - Continue prenatal vitamins  - Weight gain counseling performed.   - Pregravid BMI 18.5-24.9: Recommend 25-35 lb  - Return to clinic in 2-3 weeks for return prenatal visit  - pt taking oral and vaginal estrogen and progesterone that were prescribed/recommended by a provider in [place] and her herbalist. States that she always takes these throughout her entire pregnancy and plans to do so with this pregnancy.     Diagnosis Plan   1. Rh negative status  during pregnancy in first trimester  Antibody Screen    Rhogam given today 2/2 spotting in early preganancy   2. Short cervix affecting pregnancy  US Ob Transvaginal    Continue oral and vaginal hormones at this time.   3. History of recurrent miscarriages     4. Multigravida of advanced maternal age in first trimester     5. 12 weeks gestation of pregnancy     6. H/O successful vaginal birth after , currently pregnant       Veronica Yaakov, KARMA  2022  10:19 CDT

## 2022-09-13 NOTE — PROGRESS NOTES
I spent approximately 40 minutes with the patient acquiring the health and history intake, reviewing prior records, discussing topics related to healthy pregnancy, entering orders, and documentation. She is accompanied by her . Her LMP is approximately 22. She had an ultrasound at [facility] in [place]. She was told she had a subchorionic hemorrhage. The patient said she did have some spotting a few weeks ago. She is scheduled for an ultrasound today.  This is her 9th  Pregnancy. She had a csection in  due to PROM and baby being breech. She has had 3  since that delivery. She has had 4 pregnancy losses also. She had a D&C in  for an incomplete AB. She had went to a specialist in [place] a few years ago. Since becoming pregnant, she has resumed taking Progesterone and Estradiol tablets and Estrogen Cream.   The patient has hypothroidism and takes [place] Thyroid. She had her thyroid labs about 6 weeks ago at the [facility]. The patient tells me they were normal. The patient is on Citolapram for anxiety and depression. She filled out the depression screening questionnaire and scored 10.    A newob bag is given today. The 1st trimester teaching was done with the patient. We discussed a healthy diet and exercise and what is recommended. I also discussed Listeriosis and Toxoplasmosis and what fish to avoid due to high mercury levels. I encouraged her to make an appointment with the dentist if she has not had a dental exam and cleaning in the last 6 months.  The patient does not drink alcohol, use illicit drugs, or smoke tobacco. She plans to breastfeed again. I encouraged the patient to get the TDAP vaccine in the 3rd trimester.  I discussed with the patient that a pediatrician needs to be chosen prior to delivery for the infant to have an appointment scheduled before leaving the hospital.  I discussed lab tests will be done today. Her last pap smear was negative with HPV  negative on 3/9/21. All questions were answered at this time. She is scheduled for an ultrasound today and to see Veronica PARADA.

## 2022-09-14 LAB — RUBV IGG SERPL IA-ACNC: 4.01 INDEX

## 2022-10-05 ENCOUNTER — ROUTINE PRENATAL (OUTPATIENT)
Dept: OBSTETRICS AND GYNECOLOGY | Facility: CLINIC | Age: 42
End: 2022-10-05

## 2022-10-05 VITALS — SYSTOLIC BLOOD PRESSURE: 102 MMHG | DIASTOLIC BLOOD PRESSURE: 68 MMHG | BODY MASS INDEX: 23.42 KG/M2 | WEIGHT: 126 LBS

## 2022-10-05 DIAGNOSIS — Z36.3 ENCOUNTER FOR ANTENATAL SCREENING FOR MALFORMATION USING ULTRASOUND: ICD-10-CM

## 2022-10-05 DIAGNOSIS — Z67.91 RH NEGATIVE STATUS DURING PREGNANCY IN SECOND TRIMESTER: ICD-10-CM

## 2022-10-05 DIAGNOSIS — O26.892 RH NEGATIVE STATUS DURING PREGNANCY IN SECOND TRIMESTER: ICD-10-CM

## 2022-10-05 DIAGNOSIS — Z3A.15 15 WEEKS GESTATION OF PREGNANCY: ICD-10-CM

## 2022-10-05 DIAGNOSIS — O34.219 H/O SUCCESSFUL VAGINAL BIRTH AFTER CESAREAN, CURRENTLY PREGNANT: ICD-10-CM

## 2022-10-05 DIAGNOSIS — O09.522 MULTIGRAVIDA OF ADVANCED MATERNAL AGE IN SECOND TRIMESTER: ICD-10-CM

## 2022-10-05 DIAGNOSIS — N96 HISTORY OF RECURRENT MISCARRIAGES: Primary | ICD-10-CM

## 2022-10-05 PROCEDURE — 99213 OFFICE O/P EST LOW 20 MIN: CPT | Performed by: NURSE PRACTITIONER

## 2022-10-05 NOTE — PROGRESS NOTES
CC: Prenatal visit    John Puag is a 41 y.o.  at 15w6d.  Doing well.  Denies N/V, dysuria, abnormal vaginal d/c, headaches, heartburn, regular  contractions, LOF, or VB.  Reports good FM. Still having intermittent cramping but has improved.     /68   Wt 57.2 kg (126 lb)   LMP 2022 (Approximate)   BMI 23.42 kg/m²   SVE: NA    U/S preliminary report reviewed. Sized c/w assigned GA. Anterior placenta noted. TACL- 3.27cm.     Fetal Heart Rate: 141     Problems (from 22 to present)     Problem Noted Resolved    Multigravida of advanced maternal age in second trimester 2022 by Veronica Nuno APRN No    History of recurrent miscarriages 2022 by Veronica Nuno APRN No    Rh negative status during pregnancy in second trimester 2022 by Veronica Nuno APRN No    H/O successful vaginal birth after , currently pregnant 2022 by Veronica Nuno APRN No    Overview Signed 2022 10:00 AM by Veronica Nuno APRN     G1: PLTCS 2/2 breech presentation.  Successful vaginal deliveries following x3               A/P: John Puga is a 41 y.o.  at 15w6d.  - RTC in 4 weeks following anatomy u/s with [facility]  - Reviewed COVID-19 visitation policy  - Reviewed COVID-19 precautions     Diagnosis Plan   1. History of recurrent miscarriages     2. Multigravida of advanced maternal age in second trimester  US Ob 14 + Weeks Single or First Gestation   3. Rh negative status during pregnancy in second trimester     4. H/O successful vaginal birth after , currently pregnant     5. 15 weeks gestation of pregnancy     6. Encounter for  screening for malformation using ultrasound  US Ob 14 + Weeks Single or First Gestation     KARMA Russo  10/5/2022  10:04 CDT

## 2022-10-06 DIAGNOSIS — O09.522 MULTIGRAVIDA OF ADVANCED MATERNAL AGE IN SECOND TRIMESTER: ICD-10-CM

## 2022-10-06 DIAGNOSIS — Z36.3 ENCOUNTER FOR ANTENATAL SCREENING FOR MALFORMATION USING ULTRASOUND: ICD-10-CM

## 2022-10-18 ENCOUNTER — TELEPHONE (OUTPATIENT)
Dept: OBSTETRICS AND GYNECOLOGY | Facility: CLINIC | Age: 42
End: 2022-10-18

## 2022-10-18 RX ORDER — AMPICILLIN 500 MG/1
500 CAPSULE ORAL 2 TIMES DAILY
Qty: 20 CAPSULE | Refills: 0 | Status: SHIPPED | OUTPATIENT
Start: 2022-10-18 | End: 2022-10-28

## 2022-10-18 NOTE — TELEPHONE ENCOUNTER
17 AND 5 WEEKS PREGNANT PT CALLED WITH SOME QUESTIONS.  SHE HAS A BAD TOOTH THAT IS CAUSING PAIN AND HAS BEEN USING HOME REMEDIES TO HELP WITH THE PAIN, BUT SHE HAS SEEN A DENTIST IN THE PAST THAT HAS TOLD HER THAT SHE NEEDS THE TOOTH PULLED.  SHE IS WONDERING IF INFECTION COULD CAUSE ANY PROBLEMS WITH HER BABY.  I TOLD HER THAT PER [facility] YA, YES, INFECTION, IF NOT TAKEN CARE OF COULD CAUSE POSSIBLE HEART DEFECTS IN HER FETUS AND ADVISED HER TO GO TO THE DENTIST AND HAVE IT PULLED IF AT ALL POSSIBLE.  THE PT VERBALIZED UNDERSTANDING AND WE ALSO TOLD HER THAT WE ARE SENDING IN A RX FOR AMPICILLIN 500 MG BID X 10 DAYS TO [facility].

## 2022-11-02 ENCOUNTER — LAB (OUTPATIENT)
Dept: LAB | Facility: HOSPITAL | Age: 42
End: 2022-11-02

## 2022-11-02 ENCOUNTER — ROUTINE PRENATAL (OUTPATIENT)
Dept: OBSTETRICS AND GYNECOLOGY | Facility: CLINIC | Age: 42
End: 2022-11-02

## 2022-11-02 VITALS — BODY MASS INDEX: 24.35 KG/M2 | SYSTOLIC BLOOD PRESSURE: 102 MMHG | DIASTOLIC BLOOD PRESSURE: 60 MMHG | WEIGHT: 131 LBS

## 2022-11-02 DIAGNOSIS — N96 HISTORY OF RECURRENT MISCARRIAGES: Primary | ICD-10-CM

## 2022-11-02 DIAGNOSIS — E03.9 HYPOTHYROIDISM AFFECTING PREGNANCY IN SECOND TRIMESTER: ICD-10-CM

## 2022-11-02 DIAGNOSIS — O26.892 RH NEGATIVE STATUS DURING PREGNANCY IN SECOND TRIMESTER: ICD-10-CM

## 2022-11-02 DIAGNOSIS — O09.522 MULTIGRAVIDA OF ADVANCED MATERNAL AGE IN SECOND TRIMESTER: ICD-10-CM

## 2022-11-02 DIAGNOSIS — O34.219 H/O SUCCESSFUL VAGINAL BIRTH AFTER CESAREAN, CURRENTLY PREGNANT: ICD-10-CM

## 2022-11-02 DIAGNOSIS — Z3A.19 19 WEEKS GESTATION OF PREGNANCY: ICD-10-CM

## 2022-11-02 DIAGNOSIS — O99.282 HYPOTHYROIDISM AFFECTING PREGNANCY IN SECOND TRIMESTER: ICD-10-CM

## 2022-11-02 DIAGNOSIS — Z67.91 RH NEGATIVE STATUS DURING PREGNANCY IN SECOND TRIMESTER: ICD-10-CM

## 2022-11-02 LAB — TSH SERPL DL<=0.05 MIU/L-ACNC: 2.39 UIU/ML (ref 0.27–4.2)

## 2022-11-02 PROCEDURE — 84443 ASSAY THYROID STIM HORMONE: CPT | Performed by: NURSE PRACTITIONER

## 2022-11-02 PROCEDURE — 99213 OFFICE O/P EST LOW 20 MIN: CPT | Performed by: NURSE PRACTITIONER

## 2022-11-02 NOTE — PROGRESS NOTES
CC: Prenatal visit    John Puga is a 41 y.o.  at 19w6d.  Doing well.  Denies N/V, dysuria, abnormal vaginal d/c, headaches, heartburn, constipation, cramping contractions, LOF, or VB.  Reports occasional FM.    /60   Wt 59.4 kg (131 lb)   LMP 2022 (Approximate)   BMI 24.35 kg/m²   SVE: NA    Anatomy preliminary report reviewed. Subopts noted of heart, spine and abdomen. Persistent right umbilical vein noted. All other anatomy seen and noted to appear normal at this time. Anterior placenta w/o previa, with normal insertion of a 3VC.      Fetal Heart Rate: 148     Problems (from 22 to present)     Problem Noted Resolved    Hypothyroidism affecting pregnancy in second trimester 2022 by Veronica Nuno APRN No    Multigravida of advanced maternal age in second trimester 2022 by Veronica Nuno APRN No    History of recurrent miscarriages 2022 by Veronica Nuno APRN No    Rh negative status during pregnancy in second trimester 2022 by Veronica Nuno APRN No    H/O successful vaginal birth after , currently pregnant 2022 by Veronica Nuno APRN No    Overview Signed 2022 10:00 AM by Veronica Nuno APRN     G1: PLTCS 2/2 breech presentation.  Successful vaginal deliveries following x3               A/P: John Puga is a 41 y.o.  at 19w6d.  - RTC in 3-4 weeks  - MFM may recommended fetal cardiac echo due to PRUV but will wait to schedule any type of f/u until final recommendations are back. Will need f/u scan for subopts but pt would like to do both at the same time if the echo is recommended.   - Reviewed COVID-19 visitation policy  - Reviewed COVID-19 precautions     Diagnosis Plan   1. History of recurrent miscarriages        2. Multigravida of advanced maternal age in second trimester        3. Rh negative status during pregnancy in second trimester        4. H/O successful vaginal birth after , currently pregnant         5. Hypothyroidism affecting pregnancy in second trimester  TSH      6. 19 weeks gestation of pregnancy          Veronica Nuno, KARMA  11/2/2022  11:25 CDT

## 2022-11-07 ENCOUNTER — TELEPHONE (OUTPATIENT)
Dept: OBSTETRICS AND GYNECOLOGY | Facility: CLINIC | Age: 42
End: 2022-11-07

## 2022-11-07 DIAGNOSIS — Z36.2 ENCOUNTER FOR OTHER ANTENATAL SCREENING FOLLOW-UP: ICD-10-CM

## 2022-11-07 DIAGNOSIS — O28.3 ABNORMAL FETAL ULTRASOUND: Primary | ICD-10-CM

## 2022-11-07 NOTE — TELEPHONE ENCOUNTER
Reviewed final report. Needs f/u for subopts but also needs a fetal echo for persistent right umbilical vein, per MFM.

## 2022-11-07 NOTE — TELEPHONE ENCOUNTER
F/u anatomy and fetal echo scheduled with [facility] for 11/21 @ 1000 will see Dr. Villanueva following scans.

## 2022-11-08 DIAGNOSIS — O28.3 ABNORMAL FETAL ULTRASOUND: ICD-10-CM

## 2022-11-22 PROBLEM — Z86.2 HISTORY OF THROMBOCYTOPENIA: Status: ACTIVE | Noted: 2022-11-22

## 2022-11-22 PROBLEM — O35.8XX0 UMBILICAL VEIN ABNORMALITY AFFECTING PREGNANCY: Status: ACTIVE | Noted: 2022-11-22

## 2022-11-29 DIAGNOSIS — Z36.2 ENCOUNTER FOR OTHER ANTENATAL SCREENING FOLLOW-UP: ICD-10-CM

## 2022-11-29 DIAGNOSIS — O28.3 ABNORMAL FETAL ULTRASOUND: ICD-10-CM

## 2022-12-12 ENCOUNTER — LAB (OUTPATIENT)
Dept: LAB | Facility: HOSPITAL | Age: 42
End: 2022-12-12

## 2022-12-12 ENCOUNTER — ROUTINE PRENATAL (OUTPATIENT)
Dept: OBSTETRICS AND GYNECOLOGY | Facility: CLINIC | Age: 42
End: 2022-12-12

## 2022-12-12 VITALS — WEIGHT: 147 LBS | SYSTOLIC BLOOD PRESSURE: 100 MMHG | BODY MASS INDEX: 27.33 KG/M2 | DIASTOLIC BLOOD PRESSURE: 56 MMHG

## 2022-12-12 DIAGNOSIS — Z3A.25 25 WEEKS GESTATION OF PREGNANCY: ICD-10-CM

## 2022-12-12 DIAGNOSIS — Z67.91 RH NEGATIVE STATUS DURING PREGNANCY IN SECOND TRIMESTER: ICD-10-CM

## 2022-12-12 DIAGNOSIS — N96 HISTORY OF RECURRENT MISCARRIAGES: ICD-10-CM

## 2022-12-12 DIAGNOSIS — Z36.89 ENCOUNTER FOR OTHER SPECIFIED ANTENATAL SCREENING: ICD-10-CM

## 2022-12-12 DIAGNOSIS — O35.8XX0 UMBILICAL VEIN ABNORMALITY AFFECTING PREGNANCY, SINGLE OR UNSPECIFIED FETUS: Primary | ICD-10-CM

## 2022-12-12 DIAGNOSIS — O34.219 H/O SUCCESSFUL VAGINAL BIRTH AFTER CESAREAN, CURRENTLY PREGNANT: ICD-10-CM

## 2022-12-12 DIAGNOSIS — O09.522 MULTIGRAVIDA OF ADVANCED MATERNAL AGE IN SECOND TRIMESTER: ICD-10-CM

## 2022-12-12 DIAGNOSIS — O99.282 HYPOTHYROIDISM AFFECTING PREGNANCY IN SECOND TRIMESTER: ICD-10-CM

## 2022-12-12 DIAGNOSIS — O26.892 RH NEGATIVE STATUS DURING PREGNANCY IN SECOND TRIMESTER: ICD-10-CM

## 2022-12-12 DIAGNOSIS — Z86.2 HISTORY OF THROMBOCYTOPENIA: ICD-10-CM

## 2022-12-12 DIAGNOSIS — E03.9 HYPOTHYROIDISM AFFECTING PREGNANCY IN SECOND TRIMESTER: ICD-10-CM

## 2022-12-12 PROCEDURE — 99214 OFFICE O/P EST MOD 30 MIN: CPT | Performed by: NURSE PRACTITIONER

## 2022-12-12 PROCEDURE — 86850 RBC ANTIBODY SCREEN: CPT | Performed by: NURSE PRACTITIONER

## 2022-12-12 NOTE — PROGRESS NOTES
CC: Prenatal visit    John Puga is a 42 y.o.  at 25w4d.  Doing well.  Denies N/V, dysuria, abnormal vaginal d/c, headaches, heartburn, constipation, cramping, regular contractions, LOF, or VB.  Reports good FM.    /56   Wt 66.7 kg (147 lb)   LMP 2022 (Approximate)   BMI 27.33 kg/m²   SVE: NA    Fetal echo report from  reviewed. All normal. TSH normal on .  Fundal Height (cm): 26 cm  Fetal Heart Rate: 141     Problems (from 22 to present)     Problem Noted Resolved    Umbilical vein abnormality affecting pregnancy 2022 by Veronica Nuno APRN No    Overview Signed 2022  2:11 PM by Veronica Nuno APRN     PRUV seen on anatomy scan. DV present and appears normal per echo report from [facility] on 22.  MFM consult recommends weekly  testing and serial GS starting at 32 weeks.         History of thrombocytopenia 2022 by Veronica Nuno APRN No    Overview Signed 2022  2:12 PM by Veronica Nuno APRN     [facility] MFM recommends monthly platelet counts and more frequently if dropping.         Hypothyroidism affecting pregnancy in second trimester 2022 by Veronica Nuno APRN No    Overview Signed 2022  4:41 PM by Veronica Nuno APRN     - WNL         Multigravida of advanced maternal age in second trimester 2022 by Veronica Nuno APRN No    History of recurrent miscarriages 2022 by Veronica Nuno APRN No    Rh negative status during pregnancy in second trimester 2022 by Veronica Nuno APRN No    H/O successful vaginal birth after , currently pregnant 2022 by Veronica Nuno APRN No    Overview Signed 2022 10:00 AM by Veronica Nuno APRN     G1: PLTCS 2/2 breech presentation.  Successful vaginal deliveries following x3               A/P: John Puga is a 42 y.o.  at 25w4d.  - RTC in 3-4 weeks  - Reviewed COVID-19 visitation policy  - Reviewed COVID-19 precautions     Diagnosis Plan    1. Umbilical vein abnormality affecting pregnancy, single or unspecified fetus  [facility] MFM recommends GS and weekly BPPs starting at 32 weeks.       2. Hypothyroidism affecting pregnancy in second trimester  Normal TSH on ; continue current levothyroxine dosage.      3. History of recurrent miscarriages        4. Multigravida of advanced maternal age in second trimester        5. Rh negative status during pregnancy in second trimester  Antibody Screen  Pt received Rhogam on 22; if antibody negative (does not show anti-D) today will need Rhogam next visit.       6. H/O successful vaginal birth after , currently pregnant        7. History of thrombocytopenia        8. Encounter for other specified  screening  CBC (No Diff)    Glucose, Post 50 Gm Glucola      9. 25 weeks gestation of pregnancy          Veronica Nuno, APRN  2022  16:41 CST

## 2022-12-13 LAB — BLD GP AB SCN SERPL QL: NEGATIVE

## 2023-01-11 ENCOUNTER — LAB (OUTPATIENT)
Dept: LAB | Facility: HOSPITAL | Age: 43
End: 2023-01-11

## 2023-01-11 ENCOUNTER — ROUTINE PRENATAL (OUTPATIENT)
Dept: OBSTETRICS AND GYNECOLOGY | Facility: CLINIC | Age: 43
End: 2023-01-11

## 2023-01-11 VITALS — BODY MASS INDEX: 26.02 KG/M2 | WEIGHT: 140 LBS | DIASTOLIC BLOOD PRESSURE: 62 MMHG | SYSTOLIC BLOOD PRESSURE: 100 MMHG

## 2023-01-11 DIAGNOSIS — E03.9 HYPOTHYROIDISM AFFECTING PREGNANCY IN SECOND TRIMESTER: ICD-10-CM

## 2023-01-11 DIAGNOSIS — O35.8XX0 UMBILICAL VEIN ABNORMALITY AFFECTING PREGNANCY, SINGLE OR UNSPECIFIED FETUS: Primary | ICD-10-CM

## 2023-01-11 DIAGNOSIS — N96 HISTORY OF RECURRENT MISCARRIAGES: ICD-10-CM

## 2023-01-11 DIAGNOSIS — Z36.89 ENCOUNTER FOR OTHER SPECIFIED ANTENATAL SCREENING: ICD-10-CM

## 2023-01-11 DIAGNOSIS — Z3A.29 29 WEEKS GESTATION OF PREGNANCY: ICD-10-CM

## 2023-01-11 DIAGNOSIS — O09.522 MULTIGRAVIDA OF ADVANCED MATERNAL AGE IN SECOND TRIMESTER: ICD-10-CM

## 2023-01-11 DIAGNOSIS — Z67.91 RH NEGATIVE STATUS DURING PREGNANCY IN SECOND TRIMESTER: ICD-10-CM

## 2023-01-11 DIAGNOSIS — O99.282 HYPOTHYROIDISM AFFECTING PREGNANCY IN SECOND TRIMESTER: ICD-10-CM

## 2023-01-11 DIAGNOSIS — O34.219 H/O SUCCESSFUL VAGINAL BIRTH AFTER CESAREAN, CURRENTLY PREGNANT: ICD-10-CM

## 2023-01-11 DIAGNOSIS — O26.892 RH NEGATIVE STATUS DURING PREGNANCY IN SECOND TRIMESTER: ICD-10-CM

## 2023-01-11 DIAGNOSIS — Z86.2 HISTORY OF THROMBOCYTOPENIA: ICD-10-CM

## 2023-01-11 LAB
DEPRECATED RDW RBC AUTO: 43.1 FL (ref 37–54)
ERYTHROCYTE [DISTWIDTH] IN BLOOD BY AUTOMATED COUNT: 12.9 % (ref 12.3–15.4)
GLUCOSE 1H P 100 G GLC PO SERPL-MCNC: 191 MG/DL (ref 65–139)
HCT VFR BLD AUTO: 32.6 % (ref 34–46.6)
HGB BLD-MCNC: 11.4 G/DL (ref 12–15.9)
MCH RBC QN AUTO: 31.7 PG (ref 26.6–33)
MCHC RBC AUTO-ENTMCNC: 35 G/DL (ref 31.5–35.7)
MCV RBC AUTO: 90.6 FL (ref 79–97)
PLATELET # BLD AUTO: 118 10*3/MM3 (ref 140–450)
PMV BLD AUTO: 12 FL (ref 6–12)
RBC # BLD AUTO: 3.6 10*6/MM3 (ref 3.77–5.28)
WBC NRBC COR # BLD: 7.69 10*3/MM3 (ref 3.4–10.8)

## 2023-01-11 PROCEDURE — 85027 COMPLETE CBC AUTOMATED: CPT

## 2023-01-11 PROCEDURE — 82950 GLUCOSE TEST: CPT

## 2023-01-11 PROCEDURE — 99213 OFFICE O/P EST LOW 20 MIN: CPT | Performed by: NURSE PRACTITIONER

## 2023-01-11 PROCEDURE — 96372 THER/PROPH/DIAG INJ SC/IM: CPT | Performed by: NURSE PRACTITIONER

## 2023-01-11 PROCEDURE — 36415 COLL VENOUS BLD VENIPUNCTURE: CPT

## 2023-01-11 NOTE — PROGRESS NOTES
CC: Prenatal visit    John Puga is a 42 y.o.  at 29w6d.  Doing well.  Denies N/V, dysuria, abnormal vaginal d/c, headaches, heartburn, constipation, cramping, regular contractions, LOF, or VB.  Reports good FM.    /62   Wt 63.5 kg (140 lb)   LMP 2022 (Approximate)   BMI 26.02 kg/m²   SVE: NA  Fundal Height (cm): 29 cm  Fetal Heart Rate: 144     Problems (from 22 to present)     Problem Noted Resolved    Umbilical vein abnormality affecting pregnancy 2022 by Veronica Nuno APRN No    Overview Signed 2022  2:11 PM by Veronica Nuno APRN     PRUV seen on anatomy scan. DV present and appears normal per echo report from [facility] on 22.  MFM consult recommends weekly  testing and serial GS starting at 32 weeks.         History of thrombocytopenia 2022 by Veronica Nuno APRN No    Overview Signed 2022  2:12 PM by Veronica Nuno APRN     [facility] MF recommends monthly platelet counts and more frequently if dropping.         Hypothyroidism affecting pregnancy in second trimester 2022 by Veronica Nuno APRN No    Overview Signed 2022  4:41 PM by Veronica Nuno APRN     - WNL         Multigravida of advanced maternal age in second trimester 2022 by Veronica Nuno APRN No    History of recurrent miscarriages 2022 by Veronica Nuno APRN No    Rh negative status during pregnancy in second trimester 2022 by Veronica Nuno APRN No    H/O successful vaginal birth after , currently pregnant 2022 by Veronica Nuno APRN No    Overview Signed 2022 10:00 AM by Veronica Nuno APRN     G1: PLTCS 2/2 breech presentation.  Successful vaginal deliveries following x3               A/P: John Puga is a 42 y.o.  at 29w6d.  - RTC in 2 weeks  - 2 weeks start weekly BPP and serial GS with [facility]; wants to see Dr. Funk twice before delivery so will schedule 2 BPPs for [place] as well  - Reviewed COVID-19  visitation policy  - Reviewed COVID-19 precautions     Diagnosis Plan   1. Umbilical vein abnormality affecting pregnancy, single or unspecified fetus  US ob follow up transabdominal approach    US fetal biophysical profile wo non stress testing      2. Hypothyroidism affecting pregnancy in second trimester  US ob follow up transabdominal approach    US fetal biophysical profile wo non stress testing      3. History of recurrent miscarriages        4. Multigravida of advanced maternal age in second trimester  US ob follow up transabdominal approach    US fetal biophysical profile wo non stress testing      5. Rh negative status during pregnancy in second trimester  Rhogam Immune Globulin Immunization      6. H/O successful vaginal birth after , currently pregnant        7. History of thrombocytopenia        8. 29 weeks gestation of pregnancy          Veronica Nuno, APRN  2023  11:30 CST

## 2023-01-25 ENCOUNTER — ROUTINE PRENATAL (OUTPATIENT)
Dept: OBSTETRICS AND GYNECOLOGY | Facility: CLINIC | Age: 43
End: 2023-01-25

## 2023-01-25 VITALS — SYSTOLIC BLOOD PRESSURE: 102 MMHG | DIASTOLIC BLOOD PRESSURE: 64 MMHG | WEIGHT: 142 LBS | BODY MASS INDEX: 26.4 KG/M2

## 2023-01-25 DIAGNOSIS — Z86.2 HISTORY OF THROMBOCYTOPENIA: ICD-10-CM

## 2023-01-25 DIAGNOSIS — O99.282 HYPOTHYROIDISM AFFECTING PREGNANCY IN SECOND TRIMESTER: ICD-10-CM

## 2023-01-25 DIAGNOSIS — E03.9 HYPOTHYROIDISM AFFECTING PREGNANCY IN SECOND TRIMESTER: ICD-10-CM

## 2023-01-25 DIAGNOSIS — Z3A.31 31 WEEKS GESTATION OF PREGNANCY: ICD-10-CM

## 2023-01-25 DIAGNOSIS — N96 HISTORY OF RECURRENT MISCARRIAGES: ICD-10-CM

## 2023-01-25 DIAGNOSIS — O26.892 RH NEGATIVE STATUS DURING PREGNANCY IN SECOND TRIMESTER: ICD-10-CM

## 2023-01-25 DIAGNOSIS — O34.219 H/O SUCCESSFUL VAGINAL BIRTH AFTER CESAREAN, CURRENTLY PREGNANT: ICD-10-CM

## 2023-01-25 DIAGNOSIS — O24.415 GESTATIONAL DIABETES MELLITUS (GDM) IN THIRD TRIMESTER CONTROLLED ON ORAL HYPOGLYCEMIC DRUG: ICD-10-CM

## 2023-01-25 DIAGNOSIS — Z67.91 RH NEGATIVE STATUS DURING PREGNANCY IN SECOND TRIMESTER: ICD-10-CM

## 2023-01-25 DIAGNOSIS — O09.522 MULTIGRAVIDA OF ADVANCED MATERNAL AGE IN SECOND TRIMESTER: ICD-10-CM

## 2023-01-25 DIAGNOSIS — O35.8XX0 UMBILICAL VEIN ABNORMALITY AFFECTING PREGNANCY, SINGLE OR UNSPECIFIED FETUS: Primary | ICD-10-CM

## 2023-01-25 PROCEDURE — 99214 OFFICE O/P EST MOD 30 MIN: CPT | Performed by: NURSE PRACTITIONER

## 2023-01-25 RX ORDER — METFORMIN HYDROCHLORIDE 500 MG/1
500 TABLET, EXTENDED RELEASE ORAL NIGHTLY
Qty: 30 TABLET | Refills: 1 | Status: SHIPPED | OUTPATIENT
Start: 2023-01-25 | End: 2023-02-09 | Stop reason: SDUPTHER

## 2023-01-25 NOTE — PROGRESS NOTES
CC: Prenatal visit    John Puga is a 42 y.o.  at 31w6d.  Doing well.  Denies N/V, dysuria, abnormal vaginal d/c, headaches, heartburn, constipation, cramping, regular contractions, LOF, or VB.  Reports good FM.    /64   Wt 64.4 kg (142 lb)   LMP 2022 (Approximate)   BMI 26.40 kg/m²   SVE: NA    BPP- 8/8  BARBARA- 27.79cm, MVP- 9.57cm.  EFW- 2431g, 5lb 6oz, >97%tile.   AC- >97%tile    BS log reviewed (in lieu of 3hr testing): 51% are abnormal, all but 1 fasting is abnormal. Plan to initiate tx at this time.     Fetal Heart Rate: 145     Problems (from 22 to present)     Problem Noted Resolved    Gestational diabetes mellitus (GDM) in third trimester controlled on oral hypoglycemic drug 2023 by Veronica Nuno APRN No    Overview Signed 2023 11:18 AM by Veronica Nuno APRN     - dx today based on 7 days of FS with 51% abnormal; almost all fasting are abnormal so started on metformin XR 500mg today         Umbilical vein abnormality affecting pregnancy 2022 by Veronica Nuno APRN No    Overview Signed 2022  2:11 PM by Veronica Nuno APRN     PRUV seen on anatomy scan. DV present and appears normal per echo report from [facility] on 22.  MFM consult recommends weekly  testing and serial GS starting at 32 weeks.         History of thrombocytopenia 2022 by Veronica Nuno APRN No    Overview Signed 2022  2:12 PM by Veronica Nuno APRN     [facility] M[facility] recommends monthly platelet counts and more frequently if dropping.         Hypothyroidism affecting pregnancy in second trimester 2022 by Veronica Nuno APRN No    Overview Signed 2022  4:41 PM by Veronica Nuno APRN     - WNL         Multigravida of advanced maternal age in second trimester 2022 by Veronica Nuno APRN No    History of recurrent miscarriages 2022 by Veronica Nuno APRN No    Rh negative status during pregnancy in second trimester 2022 by Yaakov,  KARMA Salazar No    H/O successful vaginal birth after , currently pregnant 2022 by Veronica Nuno APRN No    Overview Signed 2022 10:00 AM by Veronica Nuno APRN     G1: PLTCS 2/2 breech presentation.  Successful vaginal deliveries following x3               A/P: John Puga is a 42 y.o.  at 31w6d.  - RTC in 1 week  - GDM dx today. Will start on metformin  HS due to significant elevated fastings. Will likely need to add a morning dose next week but will give a few days for her to make dietary changes as well. Discussed dietary changes today. RBA and potential s/e of medication reviewed. Pt agrees to plan and v/u.     Diagnosis Plan   1. Umbilical vein abnormality affecting pregnancy, single or unspecified fetus        2. Hypothyroidism affecting pregnancy in second trimester        3. History of recurrent miscarriages        4. Multigravida of advanced maternal age in second trimester        5. Rh negative status during pregnancy in second trimester        6. H/O successful vaginal birth after , currently pregnant        7. History of thrombocytopenia        8. Gestational diabetes mellitus (GDM) in third trimester controlled on oral hypoglycemic drug        9. 31 weeks gestation of pregnancy          KARMA Russo  2023  11:22 CST

## 2023-01-31 ENCOUNTER — TELEPHONE (OUTPATIENT)
Dept: OBSTETRICS AND GYNECOLOGY | Facility: CLINIC | Age: 43
End: 2023-01-31

## 2023-01-31 NOTE — TELEPHONE ENCOUNTER
Called with concerns about not being able to make it to her u/s and and office visit tomorrow due to the weather. States that her  is not comfortable getting her from [place] to [place] if the secondary roads aren't improved by tomorrow morning. States that her fasting BS the past 3 days has been 78 and 84, which is significantly improved since starting the HS dose of metformin last week. Still having some pp elevated readings but has struggled to cut out sweets. Strongly suggested that she make the dietary changes that we discussed and reiterated the importance of a steadier blood sugar. She v/u and will try to make healthier choices for her meals. If unable to get to visit tomorrow she will start doing twice daily FKCs; explained these and encouraged that she do them at least until her next appt on the 9th and after that continue doing them at least once per day. Continue to monitor BS QID as previously discussed.

## 2023-02-02 DIAGNOSIS — O09.522 MULTIGRAVIDA OF ADVANCED MATERNAL AGE IN SECOND TRIMESTER: ICD-10-CM

## 2023-02-02 DIAGNOSIS — Z86.2 HISTORY OF THROMBOCYTOPENIA: ICD-10-CM

## 2023-02-02 DIAGNOSIS — O24.415 GESTATIONAL DIABETES MELLITUS (GDM) IN THIRD TRIMESTER CONTROLLED ON ORAL HYPOGLYCEMIC DRUG: ICD-10-CM

## 2023-02-02 DIAGNOSIS — O35.8XX0 UMBILICAL VEIN ABNORMALITY AFFECTING PREGNANCY, SINGLE OR UNSPECIFIED FETUS: Primary | ICD-10-CM

## 2023-02-02 DIAGNOSIS — E03.9 HYPOTHYROIDISM AFFECTING PREGNANCY IN SECOND TRIMESTER: ICD-10-CM

## 2023-02-02 DIAGNOSIS — O99.282 HYPOTHYROIDISM AFFECTING PREGNANCY IN SECOND TRIMESTER: ICD-10-CM

## 2023-02-02 DIAGNOSIS — N96 HISTORY OF RECURRENT MISCARRIAGES: ICD-10-CM

## 2023-02-09 ENCOUNTER — ROUTINE PRENATAL (OUTPATIENT)
Dept: OBSTETRICS AND GYNECOLOGY | Facility: CLINIC | Age: 43
End: 2023-02-09

## 2023-02-09 VITALS — SYSTOLIC BLOOD PRESSURE: 116 MMHG | DIASTOLIC BLOOD PRESSURE: 70 MMHG | WEIGHT: 143.2 LBS | BODY MASS INDEX: 26.62 KG/M2

## 2023-02-09 DIAGNOSIS — O26.893 RH NEGATIVE STATUS DURING PREGNANCY IN THIRD TRIMESTER: ICD-10-CM

## 2023-02-09 DIAGNOSIS — O24.415 GESTATIONAL DIABETES MELLITUS (GDM) IN THIRD TRIMESTER CONTROLLED ON ORAL HYPOGLYCEMIC DRUG: ICD-10-CM

## 2023-02-09 DIAGNOSIS — O34.219 H/O SUCCESSFUL VAGINAL BIRTH AFTER CESAREAN, CURRENTLY PREGNANT: ICD-10-CM

## 2023-02-09 DIAGNOSIS — O36.63X0 EXCESSIVE FETAL GROWTH AFFECTING MANAGEMENT OF PREGNANCY IN THIRD TRIMESTER, SINGLE OR UNSPECIFIED FETUS: ICD-10-CM

## 2023-02-09 DIAGNOSIS — O99.282 HYPOTHYROIDISM AFFECTING PREGNANCY IN SECOND TRIMESTER: ICD-10-CM

## 2023-02-09 DIAGNOSIS — O99.283 HYPOTHYROIDISM AFFECTING PREGNANCY IN THIRD TRIMESTER: ICD-10-CM

## 2023-02-09 DIAGNOSIS — N96 HISTORY OF RECURRENT MISCARRIAGES: ICD-10-CM

## 2023-02-09 DIAGNOSIS — O35.8XX0 UMBILICAL VEIN ABNORMALITY AFFECTING PREGNANCY, SINGLE OR UNSPECIFIED FETUS: ICD-10-CM

## 2023-02-09 DIAGNOSIS — Z3A.34 34 WEEKS GESTATION OF PREGNANCY: ICD-10-CM

## 2023-02-09 DIAGNOSIS — Z67.91 RH NEGATIVE STATUS DURING PREGNANCY IN THIRD TRIMESTER: ICD-10-CM

## 2023-02-09 DIAGNOSIS — E03.9 HYPOTHYROIDISM AFFECTING PREGNANCY IN SECOND TRIMESTER: ICD-10-CM

## 2023-02-09 DIAGNOSIS — E03.9 HYPOTHYROIDISM AFFECTING PREGNANCY IN THIRD TRIMESTER: ICD-10-CM

## 2023-02-09 DIAGNOSIS — O09.522 MULTIGRAVIDA OF ADVANCED MATERNAL AGE IN SECOND TRIMESTER: ICD-10-CM

## 2023-02-09 DIAGNOSIS — O40.3XX0 POLYHYDRAMNIOS IN THIRD TRIMESTER COMPLICATION, SINGLE OR UNSPECIFIED FETUS: ICD-10-CM

## 2023-02-09 DIAGNOSIS — O09.523 MULTIGRAVIDA OF ADVANCED MATERNAL AGE IN THIRD TRIMESTER: ICD-10-CM

## 2023-02-09 DIAGNOSIS — O09.93 SUPERVISION OF HIGH RISK PREGNANCY IN THIRD TRIMESTER: Primary | ICD-10-CM

## 2023-02-09 DIAGNOSIS — Z86.2 HISTORY OF THROMBOCYTOPENIA: ICD-10-CM

## 2023-02-09 PROBLEM — O26.879 SHORT CERVIX AFFECTING PREGNANCY: Status: RESOLVED | Noted: 2022-09-13 | Resolved: 2023-02-09

## 2023-02-09 PROCEDURE — 99214 OFFICE O/P EST MOD 30 MIN: CPT | Performed by: OBSTETRICS & GYNECOLOGY

## 2023-02-09 RX ORDER — METFORMIN HYDROCHLORIDE 500 MG/1
1000 TABLET, EXTENDED RELEASE ORAL NIGHTLY
Qty: 60 TABLET | Refills: 1 | Status: SHIPPED | OUTPATIENT
Start: 2023-02-09 | End: 2023-02-16 | Stop reason: SDUPTHER

## 2023-02-09 NOTE — PROGRESS NOTES
CC: Prenatal visit    John Puga is a 42 y.o.  at 34w0d.  Doing well.  Denies contractions, LOF, or VB.  Reports good FM.    BS log:  Fasting BS  (10 elevated)  1h PP B:  (1/9 elevated)  1h PP L:  (2/6 elevated)  1h PP S:  (2/11 elevated)    /70   Wt 65 kg (143 lb 3.2 oz)   LMP 2022 (Approximate)   BMI 26.62 kg/m²      Fetal Heart Rate: 118     Prelim US- BPP , BARBARA 14.4 cm w/ MVP 5.4 cm, cephalic, placenta anterior     Problems (from 22 to present)     Problem Noted Resolved    Excessive fetal growth affecting management of pregnancy in third trimester 2023 by Yesica Funk MD No    Supervision of high risk pregnancy in third trimester 2023 by Yesica Funk MD No    Polyhydramnios in third trimester 2023 by Yesica Funk MD No    Overview Signed 2023 10:16 AM by Yesica Funk MD     Resolved          Gestational diabetes mellitus (GDM) in third trimester controlled on oral hypoglycemic drug 2023 by Veronica Nuno APRN No    Overview Signed 2023 11:18 AM by Veronica Nuno APRN     - dx today based on 7 days of FS with 51% abnormal; almost all fasting are abnormal so started on metformin XR 500mg today         Umbilical vein abnormality affecting pregnancy 2022 by Veronica Nuno APRN No    Overview Signed 2022  2:11 PM by Veronica Nuno APRN     PRUV seen on anatomy scan. DV present and appears normal per echo report from [facility] on 22.  MFM consult recommends weekly  testing and serial GS starting at 32 weeks.         History of thrombocytopenia 2022 by Veronica Nuno APRN No    Overview Signed 2022  2:12 PM by Veronica Nuno APRN     [facility] MFM recommends monthly platelet counts and more frequently if dropping.         Hypothyroidism affecting pregnancy in third trimester 2022 by Veronica Nuno, APRN No     Overview Signed 2022  4:41 PM by Veronica Nuno APRN     - WNL         Multigravida of advanced maternal age in third trimester 2022 by Veronica Nuno APRN No    History of recurrent miscarriages 2022 by Veronica Nuno APRN No    Rh negative status during pregnancy in third trimester 2022 by Veronica Nuno APRN No    H/O successful vaginal birth after , currently pregnant 2022 by Veronica Nuno APRN No    Overview Signed 2022 10:00 AM by Veronica Nuno APRN     G1: PLTCS 2/2 breech presentation.  Successful vaginal deliveries following x3               A/P: John Puga is a 42 y.o.  at 34w0d.  - RTC in 1 week w/ BPP  - RTC in 2 weeks w/ BPP+GS, GBS.  Will discuss delivery planning at that time with updated growth (RLTCS vs ).  - Encouraged snack/small meal before bed.  Increase metformin to 1000mg qhs.  - Reviewed COVID-19 visitation policy  - Reviewed COVID-19 precautions     Diagnosis Plan   1. Supervision of high risk pregnancy in third trimester        2. Gestational diabetes mellitus (GDM) in third trimester controlled on oral hypoglycemic drug  metFORMIN ER (GLUCOPHAGE-XR) 500 MG 24 hr tablet      3. Umbilical vein abnormality affecting pregnancy, single or unspecified fetus        4. History of thrombocytopenia        5. Hypothyroidism affecting pregnancy in third trimester        6. History of recurrent miscarriages        7. Multigravida of advanced maternal age in third trimester        8. Rh negative status during pregnancy in third trimester        9. H/O successful vaginal birth after , currently pregnant        10. Excessive fetal growth affecting management of pregnancy in third trimester, single or unspecified fetus        11. Polyhydramnios in third trimester complication, single or unspecified fetus  RESOLVED TODAY.      12. 34 weeks gestation of pregnancy          Yesica Funk MD  2023  10:16 CST

## 2023-02-16 ENCOUNTER — ROUTINE PRENATAL (OUTPATIENT)
Dept: OBSTETRICS AND GYNECOLOGY | Facility: CLINIC | Age: 43
End: 2023-02-16

## 2023-02-16 VITALS — BODY MASS INDEX: 26.95 KG/M2 | WEIGHT: 145 LBS | SYSTOLIC BLOOD PRESSURE: 98 MMHG | DIASTOLIC BLOOD PRESSURE: 60 MMHG

## 2023-02-16 DIAGNOSIS — O36.63X0 EXCESSIVE FETAL GROWTH AFFECTING MANAGEMENT OF PREGNANCY IN THIRD TRIMESTER, SINGLE OR UNSPECIFIED FETUS: Primary | ICD-10-CM

## 2023-02-16 DIAGNOSIS — O09.93 SUPERVISION OF HIGH RISK PREGNANCY IN THIRD TRIMESTER: ICD-10-CM

## 2023-02-16 DIAGNOSIS — N96 HISTORY OF RECURRENT MISCARRIAGES: ICD-10-CM

## 2023-02-16 DIAGNOSIS — Z86.2 HISTORY OF THROMBOCYTOPENIA: ICD-10-CM

## 2023-02-16 DIAGNOSIS — O99.283 HYPOTHYROIDISM AFFECTING PREGNANCY IN THIRD TRIMESTER: ICD-10-CM

## 2023-02-16 DIAGNOSIS — O34.219 H/O SUCCESSFUL VAGINAL BIRTH AFTER CESAREAN, CURRENTLY PREGNANT: ICD-10-CM

## 2023-02-16 DIAGNOSIS — O26.893 RH NEGATIVE STATUS DURING PREGNANCY IN THIRD TRIMESTER: ICD-10-CM

## 2023-02-16 DIAGNOSIS — O35.8XX0 UMBILICAL VEIN ABNORMALITY AFFECTING PREGNANCY, SINGLE OR UNSPECIFIED FETUS: ICD-10-CM

## 2023-02-16 DIAGNOSIS — O09.523 MULTIGRAVIDA OF ADVANCED MATERNAL AGE IN THIRD TRIMESTER: ICD-10-CM

## 2023-02-16 DIAGNOSIS — O24.415 GESTATIONAL DIABETES MELLITUS (GDM) IN THIRD TRIMESTER CONTROLLED ON ORAL HYPOGLYCEMIC DRUG: ICD-10-CM

## 2023-02-16 DIAGNOSIS — Z67.91 RH NEGATIVE STATUS DURING PREGNANCY IN THIRD TRIMESTER: ICD-10-CM

## 2023-02-16 DIAGNOSIS — Z3A.35 35 WEEKS GESTATION OF PREGNANCY: ICD-10-CM

## 2023-02-16 DIAGNOSIS — E03.9 HYPOTHYROIDISM AFFECTING PREGNANCY IN THIRD TRIMESTER: ICD-10-CM

## 2023-02-16 PROCEDURE — 99214 OFFICE O/P EST MOD 30 MIN: CPT | Performed by: NURSE PRACTITIONER

## 2023-02-16 RX ORDER — METFORMIN HYDROCHLORIDE 500 MG/1
1500 TABLET, EXTENDED RELEASE ORAL NIGHTLY
Qty: 90 TABLET | Refills: 1 | Status: ON HOLD | OUTPATIENT
Start: 2023-02-16 | End: 2023-03-09

## 2023-02-16 NOTE — PROGRESS NOTES
CC: Prenatal visit    John Puga is a 42 y.o.  at 35w0d.  Doing well.  Denies N/V, dysuria, abnormal vaginal d/c, headaches, heartburn, constipation, cramping, regular contractions, LOF, or VB.  Reports good FM.    BP 98/60   Wt 65.8 kg (145 lb)   LMP 2022 (Approximate)   BMI 26.95 kg/m²   SVE: NA    BPP- 8/8. BARBARA- 14.09cm  BS log reviewed.  fastings abnormal. B- 0/5 abn, L- 0/2 abn, D- 0/2 abn. Advised and encouraged her to check pp values more frequently.       Fetal Heart Rate: 139     Problems (from 22 to present)     Problem Noted Resolved    Excessive fetal growth affecting management of pregnancy in third trimester 2023 by Yesica Funk MD No    Supervision of high risk pregnancy in third trimester 2023 by Yesica Funk MD No    Polyhydramnios in third trimester 2023 by Yesica Funk MD No    Overview Signed 2023 10:16 AM by Yesica Funk MD     Resolved          Gestational diabetes mellitus (GDM) in third trimester controlled on oral hypoglycemic drug 2023 by Veronica Nuno APRN No    Overview Signed 2023 11:18 AM by Veronica Nuno APRN     - dx today based on 7 days of FS with 51% abnormal; almost all fasting are abnormal so started on metformin XR 500mg today         Umbilical vein abnormality affecting pregnancy 2022 by Veronica Nuno APRN No    Overview Signed 2022  2:11 PM by Veronica Nuno APRN     PRUV seen on anatomy scan. DV present and appears normal per echo report from [facility] on 22.  MFM consult recommends weekly  testing and serial GS starting at 32 weeks.         History of thrombocytopenia 2022 by Veronica Nuno APRN No    Overview Signed 2022  2:12 PM by Cook, Veronica W, APRN     [facility] M[facility] recommends monthly platelet counts and more frequently if dropping.         Hypothyroidism affecting pregnancy in third trimester  2022 by Veronica Nuno APRN No    Overview Signed 2022  4:41 PM by Veronica Nuno APRN     - WNL         Multigravida of advanced maternal age in third trimester 2022 by Veronica Nuno APRN No    History of recurrent miscarriages 2022 by Veronica Nuno APRN No    Rh negative status during pregnancy in third trimester 2022 by Veronica Nuno APRN No    H/O successful vaginal birth after , currently pregnant 2022 by Veronica Nuno APRN No    Overview Signed 2022 10:00 AM by Veronica Nuno APRN     G1: PLTCS 2/2 breech presentation.  Successful vaginal deliveries following x3               A/P: John Puga is a 42 y.o.  at 35w0d.  - RTC in 1 week  - Increase metformin 1500mg HS. Be more consistent in checking pp values.   - Snacking on crackers after dinner when she takes metformin. Encouraged her to change that snack to something with more protein and less carbs.  - 1 week: GBS and discuss delivery date and RLTCS vs   - Reviewed COVID-19 visitation policy  - Reviewed COVID-19 precautions     Diagnosis Plan   1. Excessive fetal growth affecting management of pregnancy in third trimester, single or unspecified fetus        2. Supervision of high risk pregnancy in third trimester        3. Gestational diabetes mellitus (GDM) in third trimester controlled on oral hypoglycemic drug  metFORMIN ER (GLUCOPHAGE-XR) 500 MG 24 hr tablet      4. Umbilical vein abnormality affecting pregnancy, single or unspecified fetus        5. History of thrombocytopenia        6. Hypothyroidism affecting pregnancy in third trimester        7. History of recurrent miscarriages        8. Multigravida of advanced maternal age in third trimester        9. Rh negative status during pregnancy in third trimester        10. H/O successful vaginal birth after , currently pregnant        11. 35 weeks gestation of pregnancy          KARMA Russo  2023  08:31 CST

## 2023-02-23 ENCOUNTER — ROUTINE PRENATAL (OUTPATIENT)
Dept: OBSTETRICS AND GYNECOLOGY | Facility: CLINIC | Age: 43
End: 2023-02-23

## 2023-02-23 VITALS — DIASTOLIC BLOOD PRESSURE: 68 MMHG | WEIGHT: 143.4 LBS | BODY MASS INDEX: 26.66 KG/M2 | SYSTOLIC BLOOD PRESSURE: 110 MMHG

## 2023-02-23 DIAGNOSIS — O09.523 MULTIGRAVIDA OF ADVANCED MATERNAL AGE IN THIRD TRIMESTER: ICD-10-CM

## 2023-02-23 DIAGNOSIS — O35.8XX0 UMBILICAL VEIN ABNORMALITY AFFECTING PREGNANCY, SINGLE OR UNSPECIFIED FETUS: ICD-10-CM

## 2023-02-23 DIAGNOSIS — O40.3XX0 POLYHYDRAMNIOS IN THIRD TRIMESTER COMPLICATION, SINGLE OR UNSPECIFIED FETUS: ICD-10-CM

## 2023-02-23 DIAGNOSIS — Z86.2 HISTORY OF THROMBOCYTOPENIA: ICD-10-CM

## 2023-02-23 DIAGNOSIS — O26.893 RH NEGATIVE STATUS DURING PREGNANCY IN THIRD TRIMESTER: ICD-10-CM

## 2023-02-23 DIAGNOSIS — O09.93 SUPERVISION OF HIGH RISK PREGNANCY IN THIRD TRIMESTER: Primary | ICD-10-CM

## 2023-02-23 DIAGNOSIS — O34.219 H/O SUCCESSFUL VAGINAL BIRTH AFTER CESAREAN, CURRENTLY PREGNANT: ICD-10-CM

## 2023-02-23 DIAGNOSIS — Z67.91 RH NEGATIVE STATUS DURING PREGNANCY IN THIRD TRIMESTER: ICD-10-CM

## 2023-02-23 DIAGNOSIS — O24.415 GESTATIONAL DIABETES MELLITUS (GDM) IN THIRD TRIMESTER CONTROLLED ON ORAL HYPOGLYCEMIC DRUG: ICD-10-CM

## 2023-02-23 DIAGNOSIS — E03.9 HYPOTHYROIDISM AFFECTING PREGNANCY IN THIRD TRIMESTER: ICD-10-CM

## 2023-02-23 DIAGNOSIS — N96 HISTORY OF RECURRENT MISCARRIAGES: ICD-10-CM

## 2023-02-23 DIAGNOSIS — O99.283 HYPOTHYROIDISM AFFECTING PREGNANCY IN THIRD TRIMESTER: ICD-10-CM

## 2023-02-23 DIAGNOSIS — O36.63X0 EXCESSIVE FETAL GROWTH AFFECTING MANAGEMENT OF PREGNANCY IN THIRD TRIMESTER, SINGLE OR UNSPECIFIED FETUS: ICD-10-CM

## 2023-02-23 DIAGNOSIS — Z3A.36 36 WEEKS GESTATION OF PREGNANCY: ICD-10-CM

## 2023-02-23 PROCEDURE — 87653 STREP B DNA AMP PROBE: CPT | Performed by: OBSTETRICS & GYNECOLOGY

## 2023-02-23 PROCEDURE — 99214 OFFICE O/P EST MOD 30 MIN: CPT | Performed by: OBSTETRICS & GYNECOLOGY

## 2023-02-23 NOTE — PROGRESS NOTES
CC: Prenatal visit    John Puga is a 42 y.o.  at 36w0d.  Doing well.  Denies contractions, LOF, or VB.  Reports good FM.  Fasting BS still elevated but patient did not increase metformin as instructed at last appt.    /68   Wt 65 kg (143 lb 6.4 oz)   LMP 2022 (Approximate)   BMI 26.66 kg/m²   SVE: FT/50/-3/soft/posterior     Fetal Heart Rate: 135     Prelim US- EFW 3277g (90%ile) w/ AC 97%ile, BARBARA 26.0 cm w/ MVP 7.95 cm, cephalic, placenta anterior, BPP  Problems (from 22 to present)     Problem Noted Resolved    Excessive fetal growth affecting management of pregnancy in third trimester 2023 by Yesica Funk MD No    Supervision of high risk pregnancy in third trimester 2023 by Yesica Funk MD No    Polyhydramnios in third trimester 2023 by Yesica Funk MD No    Overview Signed 2023 10:16 AM by Yesica Funk MD     Resolved          Gestational diabetes mellitus (GDM) in third trimester controlled on oral hypoglycemic drug 2023 by Veronica Nuno APRN No    Overview Signed 2023 11:18 AM by Veronica Nuno APRN     - dx today based on 7 days of FS with 51% abnormal; almost all fasting are abnormal so started on metformin XR 500mg today         Umbilical vein abnormality affecting pregnancy 2022 by Veronica Nuno APRN No    Overview Signed 2022  2:11 PM by Veronica Nuno APRN     PRUV seen on anatomy scan. DV present and appears normal per echo report from [facility] on 22.  MFM consult recommends weekly  testing and serial GS starting at 32 weeks.         History of thrombocytopenia 2022 by Veronica Nuno APRN No    Overview Signed 2022  2:12 PM by Veronica Nuno APRN     [facility] MFM recommends monthly platelet counts and more frequently if dropping.         Hypothyroidism affecting pregnancy in third trimester 2022 by Veronica Nuno,  APRN No    Overview Signed 2022  4:41 PM by Veronica Nuno APRN     - WNL         Multigravida of advanced maternal age in third trimester 2022 by Veronica Nuno APRN No    History of recurrent miscarriages 2022 by Veronica Nuno APRN No    Rh negative status during pregnancy in third trimester 2022 by Veronica Nuno APRN No    H/O successful vaginal birth after , currently pregnant 2022 by Veronica Nuno APRN No    Overview Signed 2022 10:00 AM by Veronica Nuno APRN     G1: PLTCS 2/2 breech presentation.  Successful vaginal deliveries following x3             A/P: John Puga is a 42 y.o.  at 36w0d.  - RTC in 1 week  - GBS today  - Discussed macrosomic baby.  Discussed that elevated AC can be associated with increased risk of shoulder dystocia; discussed risks associated with shoulder dystocia.  Discussed that the polyhydramnios can put her at more risk of uterine rupture as it does distend the uterine cavity.  Discussed that given her BS issues, I would recommend delivery around 38 weeks which would mean an induction if she was not in labor at that time.  Discussed pros/cons of  vs RLTCS in the setting of today's US findings.  She and her  Bandar will talk about it and let me know at her visit next week.  If she is induced, she would like to wait on PCN if she is GBS positive until later in labor due to the baby having GI issues when she received PCN in labor before.  - Reviewed COVID-19 visitation policy  - Reviewed COVID-19 precautions     Diagnosis Plan   1. Supervision of high risk pregnancy in third trimester        2. Excessive fetal growth affecting management of pregnancy in third trimester, single or unspecified fetus        3. Gestational diabetes mellitus (GDM) in third trimester controlled on oral hypoglycemic drug        4. Umbilical vein abnormality affecting pregnancy, single or unspecified fetus        5. History of  thrombocytopenia        6. Hypothyroidism affecting pregnancy in third trimester        7. History of recurrent miscarriages        8. Multigravida of advanced maternal age in third trimester        9. Rh negative status during pregnancy in third trimester        10. H/O successful vaginal birth after , currently pregnant        11. Polyhydramnios in third trimester complication, single or unspecified fetus        12. 36 weeks gestation of pregnancy  Group B Strep (Molecular) - Swab, Vaginal/Rectum        Yesica Funk MD  2023  11:37 CST

## 2023-02-24 LAB — GROUP B STREP, DNA: NEGATIVE

## 2023-02-28 ENCOUNTER — ROUTINE PRENATAL (OUTPATIENT)
Dept: OBSTETRICS AND GYNECOLOGY | Facility: CLINIC | Age: 43
End: 2023-02-28

## 2023-02-28 VITALS — SYSTOLIC BLOOD PRESSURE: 102 MMHG | BODY MASS INDEX: 26.95 KG/M2 | DIASTOLIC BLOOD PRESSURE: 70 MMHG | WEIGHT: 145 LBS

## 2023-02-28 DIAGNOSIS — O26.893 RH NEGATIVE STATUS DURING PREGNANCY IN THIRD TRIMESTER: ICD-10-CM

## 2023-02-28 DIAGNOSIS — O34.219 H/O SUCCESSFUL VAGINAL BIRTH AFTER CESAREAN, CURRENTLY PREGNANT: ICD-10-CM

## 2023-02-28 DIAGNOSIS — N96 HISTORY OF RECURRENT MISCARRIAGES: ICD-10-CM

## 2023-02-28 DIAGNOSIS — Z86.2 HISTORY OF THROMBOCYTOPENIA: ICD-10-CM

## 2023-02-28 DIAGNOSIS — O24.415 GESTATIONAL DIABETES MELLITUS (GDM) IN THIRD TRIMESTER CONTROLLED ON ORAL HYPOGLYCEMIC DRUG: ICD-10-CM

## 2023-02-28 DIAGNOSIS — O09.93 SUPERVISION OF HIGH RISK PREGNANCY IN THIRD TRIMESTER: Primary | ICD-10-CM

## 2023-02-28 DIAGNOSIS — O40.3XX0 POLYHYDRAMNIOS IN THIRD TRIMESTER COMPLICATION, SINGLE OR UNSPECIFIED FETUS: ICD-10-CM

## 2023-02-28 DIAGNOSIS — Z67.91 RH NEGATIVE STATUS DURING PREGNANCY IN THIRD TRIMESTER: ICD-10-CM

## 2023-02-28 DIAGNOSIS — O35.8XX0 UMBILICAL VEIN ABNORMALITY AFFECTING PREGNANCY, SINGLE OR UNSPECIFIED FETUS: ICD-10-CM

## 2023-02-28 DIAGNOSIS — O36.63X0 EXCESSIVE FETAL GROWTH AFFECTING MANAGEMENT OF PREGNANCY IN THIRD TRIMESTER, SINGLE OR UNSPECIFIED FETUS: ICD-10-CM

## 2023-02-28 DIAGNOSIS — E03.9 HYPOTHYROIDISM AFFECTING PREGNANCY IN THIRD TRIMESTER: ICD-10-CM

## 2023-02-28 DIAGNOSIS — O99.283 HYPOTHYROIDISM AFFECTING PREGNANCY IN THIRD TRIMESTER: ICD-10-CM

## 2023-02-28 DIAGNOSIS — O09.523 MULTIGRAVIDA OF ADVANCED MATERNAL AGE IN THIRD TRIMESTER: ICD-10-CM

## 2023-02-28 DIAGNOSIS — Z3A.36 36 WEEKS GESTATION OF PREGNANCY: ICD-10-CM

## 2023-02-28 PROCEDURE — 99213 OFFICE O/P EST LOW 20 MIN: CPT | Performed by: OBSTETRICS & GYNECOLOGY

## 2023-02-28 RX ORDER — PROMETHAZINE HYDROCHLORIDE 25 MG/1
12.5 SUPPOSITORY RECTAL EVERY 6 HOURS PRN
Status: CANCELLED | OUTPATIENT
Start: 2023-02-28

## 2023-02-28 RX ORDER — CARBOPROST TROMETHAMINE 250 UG/ML
250 INJECTION, SOLUTION INTRAMUSCULAR AS NEEDED
Status: CANCELLED | OUTPATIENT
Start: 2023-02-28

## 2023-02-28 RX ORDER — BUTORPHANOL TARTRATE 1 MG/ML
1 INJECTION, SOLUTION INTRAMUSCULAR; INTRAVENOUS
Status: CANCELLED | OUTPATIENT
Start: 2023-02-28

## 2023-02-28 RX ORDER — LIDOCAINE HYDROCHLORIDE 10 MG/ML
5 INJECTION, SOLUTION EPIDURAL; INFILTRATION; INTRACAUDAL; PERINEURAL AS NEEDED
Status: CANCELLED | OUTPATIENT
Start: 2023-02-28

## 2023-02-28 RX ORDER — PROMETHAZINE HYDROCHLORIDE 25 MG/1
25 TABLET ORAL EVERY 6 HOURS PRN
Status: CANCELLED | OUTPATIENT
Start: 2023-02-28

## 2023-02-28 RX ORDER — SODIUM CHLORIDE, SODIUM LACTATE, POTASSIUM CHLORIDE, CALCIUM CHLORIDE 600; 310; 30; 20 MG/100ML; MG/100ML; MG/100ML; MG/100ML
125 INJECTION, SOLUTION INTRAVENOUS CONTINUOUS
Status: CANCELLED | OUTPATIENT
Start: 2023-02-28

## 2023-02-28 RX ORDER — ONDANSETRON 4 MG/1
4 TABLET, FILM COATED ORAL EVERY 6 HOURS PRN
Status: CANCELLED | OUTPATIENT
Start: 2023-02-28

## 2023-02-28 RX ORDER — ONDANSETRON 2 MG/ML
4 INJECTION INTRAMUSCULAR; INTRAVENOUS EVERY 6 HOURS PRN
Status: CANCELLED | OUTPATIENT
Start: 2023-02-28

## 2023-02-28 RX ORDER — METHYLERGONOVINE MALEATE 0.2 MG/ML
200 INJECTION INTRAVENOUS ONCE AS NEEDED
Status: CANCELLED | OUTPATIENT
Start: 2023-02-28

## 2023-02-28 RX ORDER — IBUPROFEN 200 MG
800 TABLET ORAL EVERY 8 HOURS
Status: CANCELLED | OUTPATIENT
Start: 2023-02-28

## 2023-02-28 RX ORDER — MISOPROSTOL 100 UG/1
800 TABLET ORAL AS NEEDED
Status: CANCELLED | OUTPATIENT
Start: 2023-02-28

## 2023-02-28 RX ORDER — OXYTOCIN 10 [USP'U]/ML
999 INJECTION, SOLUTION INTRAMUSCULAR; INTRAVENOUS ONCE
Status: CANCELLED | OUTPATIENT
Start: 2023-02-28

## 2023-02-28 RX ORDER — OXYTOCIN 10 [USP'U]/ML
250 INJECTION, SOLUTION INTRAMUSCULAR; INTRAVENOUS CONTINUOUS
Status: CANCELLED | OUTPATIENT
Start: 2023-02-28 | End: 2023-02-28

## 2023-02-28 RX ORDER — SODIUM CHLORIDE 0.9 % (FLUSH) 0.9 %
3 SYRINGE (ML) INJECTION EVERY 12 HOURS SCHEDULED
Status: CANCELLED | OUTPATIENT
Start: 2023-02-28

## 2023-02-28 RX ORDER — ACETAMINOPHEN 325 MG/1
1000 TABLET ORAL EVERY 6 HOURS
Status: CANCELLED | OUTPATIENT
Start: 2023-02-28

## 2023-02-28 RX ORDER — OXYTOCIN 10 [USP'U]/ML
2-20 INJECTION, SOLUTION INTRAMUSCULAR; INTRAVENOUS
Status: CANCELLED | OUTPATIENT
Start: 2023-02-28

## 2023-02-28 RX ORDER — SODIUM CHLORIDE 0.9 % (FLUSH) 0.9 %
3-10 SYRINGE (ML) INJECTION AS NEEDED
Status: CANCELLED | OUTPATIENT
Start: 2023-02-28

## 2023-02-28 RX ORDER — BUTORPHANOL TARTRATE 1 MG/ML
2 INJECTION, SOLUTION INTRAMUSCULAR; INTRAVENOUS
Status: CANCELLED | OUTPATIENT
Start: 2023-02-28

## 2023-02-28 NOTE — PROGRESS NOTES
CC: Prenatal visit    John Puga is a 42 y.o.  at 36w5d.  Doing well.  Denies contractions, LOF, or VB.  Reports good FM.  BS about the same but she has run out of test strips.  She would like an induction rather than .    /70   Wt 65.8 kg (145 lb)   LMP 2022 (Approximate)   BMI 26.95 kg/m²   SVE: /-2/soft/mid     Fetal Heart Rate: 131     Prelim US- BPP , BARBARA 19.1 cm w/ MVP 8.4 cm, cephalic, placenta anterior     Problems (from 22 to present)     Problem Noted Resolved    Excessive fetal growth affecting management of pregnancy in third trimester 2023 by Yesica Funk MD No    Supervision of high risk pregnancy in third trimester 2023 by Yesica Funk MD No    Polyhydramnios in third trimester 2023 by Yesica Funk MD No    Overview Signed 2023 10:16 AM by Yesica Funk MD     Resolved          Gestational diabetes mellitus (GDM) in third trimester controlled on oral hypoglycemic drug 2023 by Veronica Nuno APRN No    Overview Signed 2023 11:18 AM by Veronica Nuno APRN     - dx today based on 7 days of FS with 51% abnormal; almost all fasting are abnormal so started on metformin XR 500mg today         Umbilical vein abnormality affecting pregnancy 2022 by Veronica Nuno APRN No    Overview Signed 2022  2:11 PM by Veronica Nuno APRN     PRUV seen on anatomy scan. DV present and appears normal per echo report from [facility] on 22.  MFM consult recommends weekly  testing and serial GS starting at 32 weeks.         History of thrombocytopenia 2022 by Veronica Nuno APRN No    Overview Signed 2022  2:12 PM by Veronica Nuno APRN     [facility] MFM recommends monthly platelet counts and more frequently if dropping.         Hypothyroidism affecting pregnancy in third trimester 2022 by Veronica Nuno, APRN No    Overview Signed  2022  4:41 PM by Veronica Nuno APRN     11/2- WNL         Multigravida of advanced maternal age in third trimester 2022 by Veronica Nuno APRN No    History of recurrent miscarriages 2022 by Veronica Nuno APRN No    Rh negative status during pregnancy in third trimester 2022 by Veronica Nuno APRN No    H/O successful vaginal birth after , currently pregnant 2022 by Veronica Nuno APRN No    Overview Signed 2022 10:00 AM by Veronica Nuno APRN     G1: PLTCS 2/2 breech presentation.  Successful vaginal deliveries following x3             A/P: John Puga is a 42 y.o.  at 36w5d.  - IOL/TOLAC scheduled for 3/9 secondary to GDMA2-metformin, pitocin  - Reviewed COVID-19 visitation policy  - Reviewed COVID-19 precautions     Diagnosis Plan   1. Supervision of high risk pregnancy in third trimester        2. Excessive fetal growth affecting management of pregnancy in third trimester, single or unspecified fetus        3. Gestational diabetes mellitus (GDM) in third trimester controlled on oral hypoglycemic drug        4. Umbilical vein abnormality affecting pregnancy, single or unspecified fetus        5. History of thrombocytopenia        6. Hypothyroidism affecting pregnancy in third trimester        7. History of recurrent miscarriages        8. Multigravida of advanced maternal age in third trimester        9. Rh negative status during pregnancy in third trimester        10. H/O successful vaginal birth after , currently pregnant        11. Polyhydramnios in third trimester complication, single or unspecified fetus        12. 36 weeks gestation of pregnancy          Yesica Funk MD  2023  12:21 CST

## 2023-02-28 NOTE — H&P
[facility]  HISTORY & PHYSICAL - Obstetrics    Name: John Puga  MRN: [id number]  Location: Room/bed info not found  Date: 2023   [facility]: [phone]      CHIEF COMPLAINT:  IOL/TOLAC secondary to GDM    HISTORY OF PRESENT ILLNESS  John Puga is a 42 y.o.  at 36w5d who presents today for RPN.  She is scheduled for IOL at 38w0d secondary to GDM; she is on metformin however her fasting BS are still elevated.  Today, denies LOF, vaginal bleeding, or regular contractions.  She has had some contractions.  Reports good FM.    Patient denies any chest pain, palpitations, headaches, lightheadedness, shortness of breath, cough, nausea, vomiting, diarrhea, constipation, fever, or chills.    ROS  Review of Systems   Constitutional: Negative.    HENT: Negative.    Eyes: Negative.    Respiratory: Negative.    Cardiovascular: Negative.    Gastrointestinal: Negative.    Endocrine: Negative.    Genitourinary: Negative.    Musculoskeletal: Negative.    Skin: Negative.    Allergic/Immunologic: Negative.    Neurological: Negative.    Hematological: Negative.    Psychiatric/Behavioral: Negative.      PRENATAL LAB RESULTS  Prenatal labs reviewed  External Prenatal Results     Pregnancy Outside Results - Transcribed From Office Records - See Scanned Records For Details     Test Value Date Time    ABO  A  21 0846    Rh  Negative  21 0846    Antibody Screen  Negative  22 1523       Negative  22 1033    Varicella IgG       Rubella  4.01 index 22 1033    Hgb  11.4 g/dL 23 1044       13.8 g/dL 22 1033    Hct  32.6 % 23 1044       40.9 % 22 1033    Glucose Fasting GTT       Glucose Tolerance Test 1 hour       Glucose Tolerance Test 3 hour       Gonorrhea (discrete)       Chlamydia (discrete)       RPR  Non Reactive  16 1530    VDRL       Syphilis Antibody       HBsAg  Non-Reactive  22 1033    Herpes Simplex Virus PCR       Herpes  Simplex VIrus Culture       HIV  Non Reactive  01/25/16 1530    Hep C RNA Quant PCR       Hep C Antibody  Negative  01/25/16 1530    AFP       Group B Strep  Negative  02/23/23 1014    GBS Susceptibility to Clindamycin       GBS Susceptibility to Erythromycin       Fetal Fibronectin       Genetic Testing, Maternal Blood             Drug Screening     Test Value Date Time    Urine Drug Screen       Amphetamine Screen  Negative  09/13/22 1033    Barbiturate Screen  Negative  09/13/22 1033    Benzodiazepine Screen  Negative  09/13/22 1033    Methadone Screen  Negative  09/13/22 1033    Phencyclidine Screen  Negative  09/13/22 1033    Opiates Screen  Negative  09/13/22 1033    THC Screen  Negative  09/13/22 1033    Cocaine Screen       Propoxyphene Screen  Negative  09/13/22 1033    Buprenorphine Screen  Negative  09/13/22 1033    Methamphetamine Screen       Oxycodone Screen  Negative  09/13/22 1033    Tricyclic Antidepressants Screen  Negative  09/13/22 1033          Legend    ^: Historical                      PRENATAL RISK FACTORS  9/22 Problems (from 09/13/22 to present)     Problem Noted Resolved    Excessive fetal growth affecting management of pregnancy in third trimester 2/9/2023 by Yesica Funk MD No    Supervision of high risk pregnancy in third trimester 2/9/2023 by Yesica Funk MD No    Polyhydramnios in third trimester 2/9/2023 by Yesica Funk MD No    Overview Signed 2/9/2023 10:16 AM by Yesica Funk MD     Resolved 2/9         Gestational diabetes mellitus (GDM) in third trimester controlled on oral hypoglycemic drug 1/25/2023 by Veronica Nuno, KARMA No    Overview Signed 1/25/2023 11:18 AM by Veronica Nuno APRN     1/25- dx today based on 7 days of FS with 51% abnormal; almost all fasting are abnormal so started on metformin XR 500mg today         Umbilical vein abnormality affecting pregnancy 11/22/2022 by Veronica Nuno, APRN No     Overview Signed 2022  2:11 PM by Veronica Nuno APRN     PRUV seen on anatomy scan. DV present and appears normal per echo report from [facility] on 22.  MFM consult recommends weekly  testing and serial GS starting at 32 weeks.         History of thrombocytopenia 2022 by eVronica Nuno APRN No    Overview Signed 2022  2:12 PM by Veronica Nuno APRN     TP MFM recommends monthly platelet counts and more frequently if dropping.         Hypothyroidism affecting pregnancy in third trimester 2022 by Veronica Nuno APRN No    Overview Signed 2022  4:41 PM by Veronica Nuno APRN     - WNL         Multigravida of advanced maternal age in third trimester 2022 by Veronica Nuno APRN No    History of recurrent miscarriages 2022 by Veronica Nuno APRN No    Rh negative status during pregnancy in third trimester 2022 by Veronica Nuno APRN No    H/O successful vaginal birth after , currently pregnant 2022 by Veronica Nuno APRN No    Overview Signed 2022 10:00 AM by Veronica Nuno APRN     G1: PLTCS 2/2 breech presentation.  Successful vaginal deliveries following x3             OB HISTORY  OB History    Para Term  AB Living   9 4 4   4 4   SAB IAB Ectopic Molar Multiple Live Births   4         4      # Outcome Date GA Lbr Tani/2nd Weight Sex Delivery Anes PTL Lv   9 Current            8 SAB 21     SAB         Birth Comments: D&C for incomplete Ab   7 Term 16 39w6d  3572 g (7 lb 14 oz) M    FABIO      Birth Comments: 8cm on admission      Complications: AMA (advanced maternal age) multigravida 35+, Thrombocytopenia affecting pregnancy (HCC), History of , Positive GBS test   6 Term 13 41w3d  3374 g (7 lb 7 oz) M    FABIO      Birth Comments: 8cm in office; had SROM; rechecked and found to be complete; pushed in wheelchair to L&D      Complications: Breech presentation   5 Term 11 40w1d  2920 g (6  lb 7 oz) F , Forcep   FABIO      Birth Comments: IOL      Complications: Positive GBS test   4 Term 09 38w0d  3147 g (6 lb 15 oz) F CS-LTranv Spinal  FABIO      Complications: PROM (premature rupture of membranes), Breech presentation   3 2005      2 2002        PAST MEDICAL HISTORY  Past Medical History:   Diagnosis Date   • Acquired hypothyroidism     new dx   • Blood type, Rh negative    • Depression    • Gestational diabetes mellitus (GDM) in third trimester controlled on oral hypoglycemic drug 2023   • History of transfusion    • OCD (obsessive compulsive disorder)    • Thrombocytopenia (HCC)    • Urge incontinence      PAST SURGICAL HISTORY  Past Surgical History:   Procedure Laterality Date   •  SECTION  2011   • D & C WITH SUCTION N/A 2021    Procedure: Suction dilation and curettage;  Surgeon: Yesica Funk MD;  Location: [facility];  Service: Gynecology;  Laterality: N/A;     FAMILY HISTORY  Family History   Problem Relation Age of Onset   • No Known Problems Father    • Other Mother         possible mesothelioma   • Lung cancer Mother    • No Known Problems Brother    • No Known Problems Brother    • No Known Problems Sister    • No Known Problems Son    • No Known Problems Son    • No Known Problems Daughter    • No Known Problems Daughter    • Kidney failure Paternal Grandfather    • Hypertension Paternal Grandmother    • Birth defects Neg Hx    • Breast cancer Neg Hx    • Ovarian cancer Neg Hx    • Endometrial cancer Neg Hx    • Colon cancer Neg Hx    • Diabetes Neg Hx      SOCIAL HISTORY  Social History     Socioeconomic History   • Marital status:    Tobacco Use   • Smoking status: Never   • Smokeless tobacco: Never   Vaping Use   • Vaping Use: Never used   Substance and Sexual Activity   • Alcohol use: No   • Drug use: No   • Sexual activity: Yes     Partners: Male     Comment: last pap smear negative with  HPV negative on 3/9/21     ALLERGIES  Allergies   Allergen Reactions   • Latex Rash     HOME MEDICATIONS  Prior to Admission medications    Medication Sig Start Date End Date Taking? Authorizing Provider   Alpha-Lipoic Acid (LIPOIC ACID PO) Take  by mouth.   Yes Jun Horner MD [employer] Thyroid 30 MG tablet Take 1 tablet by mouth Every Morning. 2 tabs 7/12/21  Yes Jun Horner MD   Chaste Tree (VITEX EXTRACT PO) Take  by mouth.   Yes Jun Horner MD   citalopram (CeleXA) 10 MG tablet Take 1 tablet by mouth Every Night.   Yes Jun Horner MD   Cyanocobalamin (VITAMIN B-12 PO) Take  by mouth.   Yes Jun Horner MD   estradiol (ESTRACE) 2 MG tablet Take 1 tablet by mouth Daily.   Yes Jun Horner MD   Folic Acid (FOLATE PO) Take  by mouth.   Yes Jun Horner MD   metFORMIN ER (GLUCOPHAGE-XR) 500 MG 24 hr tablet Take 3 tablets by mouth Every Night. 2/16/23  Yes Veronica Nuno APRN Misc Natural Products (ADRENAL PO) Take  by mouth.   Yes Jun Horner MD   Omega 3-6-9 Fatty Acids (OMEGA-3-6-9 PO) Take  by mouth.   Yes Jun Horner MD   prenatal vitamin (prenatal, CLASSIC, vitamin) tablet Take  by mouth Daily.   Yes Jun Horner MD   Progesterone (PROMETRIUM) 200 MG capsule Take 1 capsule by mouth Daily.   Yes Jun Horner MD   vitamin C (ASCORBIC ACID) 250 MG tablet Take 1 tablet by mouth Daily.   Yes Jun Horner MD   VITAMIN D, CHOLECALCIFEROL, PO Take  by mouth.   Yes Jun Horner MD     PHYSICAL EXAM  /70   Wt 65.8 kg (145 lb)   LMP 06/16/2022 (Approximate)   BMI 26.95 kg/m²   General: No acute distress. Well developed, well nourished. Pleasant.  Heart: Regular rate and rhythm. No murmurs, rubs, or gallops  Lungs: Clear to auscultation bilaterally. No wheezes, rales, or rhonchi.  Abdomen: Soft, nontender to palpation, enlarged by gravid uterus.    SVE: 2/ 70/ -2/ soft/ mid      IMPRESSION  John Puga is a 42 y.o.  scheduled for IOL/TOLAC secondary to GDM at 38w0d.    PLAN  1.  IOL  - Admit: Labor and Delivery  - Attending: Dr. Funk  - Condition: Stable  - Vitals: per protocol  - Activity: ad tammy  - Nursing: Continuous electronic fetal monitoring, as per protocol  - Diet: Clears  - IV fluids:  mL/hr  - Meds: Pitocin  - Allergies: Latex  - Labs: CBC, T&S, UDS  - GBS: negative.  Antibiotics: N/A  - John Puga and I have discussed pain goals for this hospitalization after reviewing her current clinical condition, medical history and prior pain experiences.  The goal is to keep her pain level appropriate.  Patient may have epidural if desired.  - Patient consented for TOLAC.  Discussed the advantages of a successful vaginal delivery including decreased risks for hemorrhage, infection, shorter hospital stay after delivery, and a more rapid recovery. The risks of undergoing a TOLAC include uterine rupture (opening of the prior incision on the uterus), which can occur in about 1% to 2% of TOLAC in the setting of a previous low transverse uterine incision. In the event of uterine rupture, there is a risk of maternal injury that may require a transfusion, removal of the uterus or damage to nearby organs like bladder or bowel. In the event of a uterine rupture, there is also a 10-25% risk of significant adverse fetal sequelae. Catastrophic rupture leading to  death or permanent injury to the  is rare, occurring less often than 1000 's. Also discussed the risk of elective repeat  section to include risk for bleeding with possible blood transfusion, infection, and injury to other organs such as bowel or bladder. She voiced understanding of the information given and desires a trial of labor.  - Anticipate     This document has been electronically signed by Yesica Funk MD on 2023 12:25 CST.

## 2023-03-09 ENCOUNTER — ANESTHESIA EVENT (OUTPATIENT)
Dept: LABOR AND DELIVERY | Facility: HOSPITAL | Age: 43
End: 2023-03-09

## 2023-03-09 ENCOUNTER — HOSPITAL ENCOUNTER (INPATIENT)
Facility: HOSPITAL | Age: 43
LOS: 2 days | Discharge: HOME | End: 2023-03-11
Attending: OBSTETRICS & GYNECOLOGY | Admitting: OBSTETRICS & GYNECOLOGY

## 2023-03-09 ENCOUNTER — ANESTHESIA (OUTPATIENT)
Dept: LABOR AND DELIVERY | Facility: HOSPITAL | Age: 43
End: 2023-03-09

## 2023-03-09 ENCOUNTER — HOSPITAL ENCOUNTER (OUTPATIENT)
Dept: LABOR AND DELIVERY | Facility: HOSPITAL | Age: 43
Discharge: HOME | End: 2023-03-09

## 2023-03-09 DIAGNOSIS — O24.415 GESTATIONAL DIABETES MELLITUS (GDM) IN THIRD TRIMESTER CONTROLLED ON ORAL HYPOGLYCEMIC DRUG: ICD-10-CM

## 2023-03-09 LAB
ABO GROUP BLD: NORMAL
AMPHET+METHAMPHET UR QL: NEGATIVE
AMPHETAMINES UR QL: NEGATIVE
ANTI-D, PASSIVE: NORMAL
BARBITURATES UR QL SCN: NEGATIVE
BENZODIAZ UR QL SCN: NEGATIVE
BLD GP AB SCN SERPL QL: POSITIVE
BUPRENORPHINE SERPL-MCNC: NEGATIVE NG/ML
CANNABINOIDS SERPL QL: NEGATIVE
COCAINE UR QL: NEGATIVE
DEPRECATED RDW RBC AUTO: 50.2 FL (ref 37–54)
ERYTHROCYTE [DISTWIDTH] IN BLOOD BY AUTOMATED COUNT: 15 % (ref 12.3–15.4)
GLUCOSE BLDC GLUCOMTR-MCNC: 94 MG/DL (ref 70–130)
HCT VFR BLD AUTO: 40 % (ref 34–46.6)
HGB BLD-MCNC: 13.1 G/DL (ref 12–15.9)
HOLD SPECIMEN: NORMAL
HOLD SPECIMEN: NORMAL
Lab: NORMAL
MCH RBC QN AUTO: 30 PG (ref 26.6–33)
MCHC RBC AUTO-ENTMCNC: 32.8 G/DL (ref 31.5–35.7)
MCV RBC AUTO: 91.7 FL (ref 79–97)
METHADONE UR QL SCN: NEGATIVE
OPIATES UR QL: NEGATIVE
OXYCODONE UR QL SCN: NEGATIVE
PCP UR QL SCN: NEGATIVE
PLATELET # BLD AUTO: 144 10*3/MM3 (ref 140–450)
PMV BLD AUTO: 11.6 FL (ref 6–12)
PROPOXYPH UR QL: NEGATIVE
RBC # BLD AUTO: 4.36 10*6/MM3 (ref 3.77–5.28)
RH BLD: NEGATIVE
T&S EXPIRATION DATE: NORMAL
TRICYCLICS UR QL SCN: NEGATIVE
WBC NRBC COR # BLD: 7.04 10*3/MM3 (ref 3.4–10.8)

## 2023-03-09 PROCEDURE — 10907ZC DRAINAGE OF AMNIOTIC FLUID, THERAPEUTIC FROM PRODUCTS OF CONCEPTION, VIA NATURAL OR ARTIFICIAL OPENING: ICD-10-PCS | Performed by: OBSTETRICS & GYNECOLOGY

## 2023-03-09 PROCEDURE — 0KQM0ZZ REPAIR PERINEUM MUSCLE, OPEN APPROACH: ICD-10-PCS | Performed by: OBSTETRICS & GYNECOLOGY

## 2023-03-09 PROCEDURE — 85027 COMPLETE CBC AUTOMATED: CPT | Performed by: OBSTETRICS & GYNECOLOGY

## 2023-03-09 PROCEDURE — 82962 GLUCOSE BLOOD TEST: CPT

## 2023-03-09 PROCEDURE — 86850 RBC ANTIBODY SCREEN: CPT | Performed by: OBSTETRICS & GYNECOLOGY

## 2023-03-09 PROCEDURE — C1755 CATHETER, INTRASPINAL: HCPCS | Performed by: NURSE ANESTHETIST, CERTIFIED REGISTERED

## 2023-03-09 PROCEDURE — 86900 BLOOD TYPING SEROLOGIC ABO: CPT | Performed by: OBSTETRICS & GYNECOLOGY

## 2023-03-09 PROCEDURE — 80306 DRUG TEST PRSMV INSTRMNT: CPT | Performed by: OBSTETRICS & GYNECOLOGY

## 2023-03-09 PROCEDURE — 86870 RBC ANTIBODY IDENTIFICATION: CPT | Performed by: OBSTETRICS & GYNECOLOGY

## 2023-03-09 PROCEDURE — 3E033VJ INTRODUCTION OF OTHER HORMONE INTO PERIPHERAL VEIN, PERCUTANEOUS APPROACH: ICD-10-PCS | Performed by: OBSTETRICS & GYNECOLOGY

## 2023-03-09 PROCEDURE — 86901 BLOOD TYPING SEROLOGIC RH(D): CPT | Performed by: OBSTETRICS & GYNECOLOGY

## 2023-03-09 PROCEDURE — C1755 CATHETER, INTRASPINAL: HCPCS

## 2023-03-09 PROCEDURE — 51703 INSERT BLADDER CATH COMPLEX: CPT

## 2023-03-09 PROCEDURE — 59400 OBSTETRICAL CARE: CPT | Performed by: OBSTETRICS & GYNECOLOGY

## 2023-03-09 RX ORDER — PROMETHAZINE HYDROCHLORIDE 25 MG/1
25 TABLET ORAL EVERY 6 HOURS PRN
Status: DISCONTINUED | OUTPATIENT
Start: 2023-03-09 | End: 2023-03-09 | Stop reason: HOSPADM

## 2023-03-09 RX ORDER — BUTORPHANOL TARTRATE 1 MG/ML
1 INJECTION, SOLUTION INTRAMUSCULAR; INTRAVENOUS
Status: DISCONTINUED | OUTPATIENT
Start: 2023-03-09 | End: 2023-03-09 | Stop reason: HOSPADM

## 2023-03-09 RX ORDER — CALCIUM CARBONATE 200(500)MG
2 TABLET,CHEWABLE ORAL 3 TIMES DAILY PRN
Status: DISCONTINUED | OUTPATIENT
Start: 2023-03-09 | End: 2023-03-11 | Stop reason: HOSPADM

## 2023-03-09 RX ORDER — ACETAMINOPHEN 500 MG
1000 TABLET ORAL EVERY 6 HOURS
Status: DISCONTINUED | OUTPATIENT
Start: 2023-03-09 | End: 2023-03-09 | Stop reason: HOSPADM

## 2023-03-09 RX ORDER — LIDOCAINE HYDROCHLORIDE 10 MG/ML
5 INJECTION, SOLUTION EPIDURAL; INFILTRATION; INTRACAUDAL; PERINEURAL AS NEEDED
Status: DISCONTINUED | OUTPATIENT
Start: 2023-03-09 | End: 2023-03-09 | Stop reason: HOSPADM

## 2023-03-09 RX ORDER — DOCUSATE SODIUM 100 MG/1
100 CAPSULE, LIQUID FILLED ORAL 2 TIMES DAILY
Status: DISCONTINUED | OUTPATIENT
Start: 2023-03-09 | End: 2023-03-11 | Stop reason: HOSPADM

## 2023-03-09 RX ORDER — OXYTOCIN/0.9 % SODIUM CHLORIDE 30/500 ML
999 PLASTIC BAG, INJECTION (ML) INTRAVENOUS ONCE
Status: DISCONTINUED | OUTPATIENT
Start: 2023-03-09 | End: 2023-03-09 | Stop reason: HOSPADM

## 2023-03-09 RX ORDER — MISOPROSTOL 200 UG/1
800 TABLET ORAL AS NEEDED
Status: DISCONTINUED | OUTPATIENT
Start: 2023-03-09 | End: 2023-03-09 | Stop reason: HOSPADM

## 2023-03-09 RX ORDER — HYDROCORTISONE 25 MG/G
1 CREAM TOPICAL 4 TIMES DAILY PRN
Status: DISCONTINUED | OUTPATIENT
Start: 2023-03-09 | End: 2023-03-11 | Stop reason: HOSPADM

## 2023-03-09 RX ORDER — SODIUM CHLORIDE, SODIUM LACTATE, POTASSIUM CHLORIDE, CALCIUM CHLORIDE 600; 310; 30; 20 MG/100ML; MG/100ML; MG/100ML; MG/100ML
125 INJECTION, SOLUTION INTRAVENOUS CONTINUOUS
Status: DISCONTINUED | OUTPATIENT
Start: 2023-03-09 | End: 2023-03-09

## 2023-03-09 RX ORDER — METHYLERGONOVINE MALEATE 0.2 MG/ML
200 INJECTION INTRAVENOUS ONCE AS NEEDED
Status: DISCONTINUED | OUTPATIENT
Start: 2023-03-09 | End: 2023-03-09 | Stop reason: HOSPADM

## 2023-03-09 RX ORDER — OXYTOCIN/0.9 % SODIUM CHLORIDE 30/500 ML
250 PLASTIC BAG, INJECTION (ML) INTRAVENOUS CONTINUOUS
Status: ACTIVE | OUTPATIENT
Start: 2023-03-09 | End: 2023-03-09

## 2023-03-09 RX ORDER — SODIUM CHLORIDE 0.9 % (FLUSH) 0.9 %
1-10 SYRINGE (ML) INJECTION AS NEEDED
Status: DISCONTINUED | OUTPATIENT
Start: 2023-03-09 | End: 2023-03-11 | Stop reason: HOSPADM

## 2023-03-09 RX ORDER — BUTORPHANOL TARTRATE 1 MG/ML
2 INJECTION, SOLUTION INTRAMUSCULAR; INTRAVENOUS
Status: DISCONTINUED | OUTPATIENT
Start: 2023-03-09 | End: 2023-03-09 | Stop reason: HOSPADM

## 2023-03-09 RX ORDER — ONDANSETRON 2 MG/ML
4 INJECTION INTRAMUSCULAR; INTRAVENOUS EVERY 6 HOURS PRN
Status: DISCONTINUED | OUTPATIENT
Start: 2023-03-09 | End: 2023-03-11 | Stop reason: HOSPADM

## 2023-03-09 RX ORDER — ONDANSETRON 4 MG/1
4 TABLET, FILM COATED ORAL EVERY 6 HOURS PRN
Status: DISCONTINUED | OUTPATIENT
Start: 2023-03-09 | End: 2023-03-11 | Stop reason: HOSPADM

## 2023-03-09 RX ORDER — LIDOCAINE HYDROCHLORIDE AND EPINEPHRINE 15; 5 MG/ML; UG/ML
INJECTION, SOLUTION EPIDURAL AS NEEDED
Status: DISCONTINUED | OUTPATIENT
Start: 2023-03-09 | End: 2023-03-09 | Stop reason: SURG

## 2023-03-09 RX ORDER — PROMETHAZINE HYDROCHLORIDE 12.5 MG/1
12.5 SUPPOSITORY RECTAL EVERY 6 HOURS PRN
Status: DISCONTINUED | OUTPATIENT
Start: 2023-03-09 | End: 2023-03-09 | Stop reason: HOSPADM

## 2023-03-09 RX ORDER — IBUPROFEN 800 MG/1
800 TABLET ORAL EVERY 8 HOURS
Status: DISCONTINUED | OUTPATIENT
Start: 2023-03-09 | End: 2023-03-09 | Stop reason: HOSPADM

## 2023-03-09 RX ORDER — ONDANSETRON 4 MG/1
4 TABLET, FILM COATED ORAL EVERY 6 HOURS PRN
Status: DISCONTINUED | OUTPATIENT
Start: 2023-03-09 | End: 2023-03-09 | Stop reason: HOSPADM

## 2023-03-09 RX ORDER — CITALOPRAM 10 MG/1
10 TABLET ORAL NIGHTLY
Status: DISCONTINUED | OUTPATIENT
Start: 2023-03-09 | End: 2023-03-11 | Stop reason: HOSPADM

## 2023-03-09 RX ORDER — ONDANSETRON 2 MG/ML
4 INJECTION INTRAMUSCULAR; INTRAVENOUS EVERY 6 HOURS PRN
Status: DISCONTINUED | OUTPATIENT
Start: 2023-03-09 | End: 2023-03-09 | Stop reason: HOSPADM

## 2023-03-09 RX ORDER — FERROUS SULFATE TAB EC 324 MG (65 MG FE EQUIVALENT) 324 (65 FE) MG
324 TABLET DELAYED RESPONSE ORAL 2 TIMES DAILY WITH MEALS
Status: DISCONTINUED | OUTPATIENT
Start: 2023-03-09 | End: 2023-03-11 | Stop reason: HOSPADM

## 2023-03-09 RX ORDER — OXYTOCIN/0.9 % SODIUM CHLORIDE 30/500 ML
2-20 PLASTIC BAG, INJECTION (ML) INTRAVENOUS
Status: DISCONTINUED | OUTPATIENT
Start: 2023-03-09 | End: 2023-03-09 | Stop reason: HOSPADM

## 2023-03-09 RX ORDER — ACETAMINOPHEN 325 MG/1
650 TABLET ORAL EVERY 6 HOURS
Status: DISCONTINUED | OUTPATIENT
Start: 2023-03-09 | End: 2023-03-11 | Stop reason: HOSPADM

## 2023-03-09 RX ORDER — SODIUM CHLORIDE 0.9 % (FLUSH) 0.9 %
3 SYRINGE (ML) INJECTION EVERY 12 HOURS SCHEDULED
Status: DISCONTINUED | OUTPATIENT
Start: 2023-03-09 | End: 2023-03-09 | Stop reason: HOSPADM

## 2023-03-09 RX ORDER — SODIUM CHLORIDE 0.9 % (FLUSH) 0.9 %
3-10 SYRINGE (ML) INJECTION AS NEEDED
Status: DISCONTINUED | OUTPATIENT
Start: 2023-03-09 | End: 2023-03-09 | Stop reason: HOSPADM

## 2023-03-09 RX ORDER — PRENATAL VIT/IRON FUM/FOLIC AC 27MG-0.8MG
1 TABLET ORAL DAILY
Status: DISCONTINUED | OUTPATIENT
Start: 2023-03-10 | End: 2023-03-11 | Stop reason: HOSPADM

## 2023-03-09 RX ORDER — BUPIVACAINE HYDROCHLORIDE 2.5 MG/ML
INJECTION, SOLUTION EPIDURAL; INFILTRATION; INTRACAUDAL AS NEEDED
Status: DISCONTINUED | OUTPATIENT
Start: 2023-03-09 | End: 2023-03-09 | Stop reason: SURG

## 2023-03-09 RX ORDER — CARBOPROST TROMETHAMINE 250 UG/ML
250 INJECTION, SOLUTION INTRAMUSCULAR AS NEEDED
Status: DISCONTINUED | OUTPATIENT
Start: 2023-03-09 | End: 2023-03-09 | Stop reason: HOSPADM

## 2023-03-09 RX ORDER — IBUPROFEN 600 MG/1
600 TABLET ORAL EVERY 6 HOURS SCHEDULED
Status: DISCONTINUED | OUTPATIENT
Start: 2023-03-10 | End: 2023-03-11 | Stop reason: HOSPADM

## 2023-03-09 RX ORDER — BISACODYL 10 MG
10 SUPPOSITORY, RECTAL RECTAL DAILY PRN
Status: DISCONTINUED | OUTPATIENT
Start: 2023-03-10 | End: 2023-03-11 | Stop reason: HOSPADM

## 2023-03-09 RX ADMIN — SODIUM CHLORIDE, POTASSIUM CHLORIDE, SODIUM LACTATE AND CALCIUM CHLORIDE 125 ML/HR: 600; 310; 30; 20 INJECTION, SOLUTION INTRAVENOUS at 04:54

## 2023-03-09 RX ADMIN — SODIUM CHLORIDE, POTASSIUM CHLORIDE, SODIUM LACTATE AND CALCIUM CHLORIDE 1000 ML: 600; 310; 30; 20 INJECTION, SOLUTION INTRAVENOUS at 09:57

## 2023-03-09 RX ADMIN — SODIUM CHLORIDE, POTASSIUM CHLORIDE, SODIUM LACTATE AND CALCIUM CHLORIDE 125 ML/HR: 600; 310; 30; 20 INJECTION, SOLUTION INTRAVENOUS at 10:36

## 2023-03-09 RX ADMIN — IBUPROFEN 600 MG: 600 TABLET, FILM COATED ORAL at 21:51

## 2023-03-09 RX ADMIN — LIDOCAINE HYDROCHLORIDE AND EPINEPHRINE 3 MG: 15; 5 INJECTION, SOLUTION EPIDURAL at 10:26

## 2023-03-09 RX ADMIN — BUPIVACAINE HYDROCHLORIDE 10 ML: 2.5 INJECTION, SOLUTION EPIDURAL; INFILTRATION; INTRACAUDAL; PERINEURAL at 10:33

## 2023-03-09 RX ADMIN — OXYTOCIN-SODIUM CHLORIDE 0.9% IV SOLN 30 UNIT/500ML 2 MILLI-UNITS/MIN: 30-0.9/5 SOLUTION at 07:15

## 2023-03-09 NOTE — PLAN OF CARE
Goal Outcome Evaluation:  Plan of Care Reviewed With: patient, spouse        Progress: improving  Outcome Evaluation: vaginal delivery, VSS, FF @ -1, bleeding WNL, pt breastfeeding

## 2023-03-09 NOTE — INTERVAL H&P NOTE
H&P reviewed. The patient was examined and there are no changes to the H&P. SVE per RN 2 cm, vertex. Patient previously counseled TOLAC vs RLTCS; desires TOLAC, signed consents. Will induce w/ pitocin. BS checks q4h. Anticipate .    This document has been electronically signed by Yesica Funk MD on 2023 07:11 CST.

## 2023-03-09 NOTE — L&D DELIVERY NOTE
[facility]  Vaginal Delivery Note    Patient Name: John Puga  : 1980  MRN: [id number]  Date of Delivery: 3/9/2023     Diagnosis     Pre & Post-Delivery:  Intrauterine pregnancy at 38w0d  Labor status: Induced Onset of Labor     Gestational diabetes mellitus (GDM) in third trimester controlled on oral hypoglycemic drug    Multigravida of advanced maternal age in third trimester    History of recurrent miscarriages    Rh negative status during pregnancy in third trimester    H/O successful vaginal birth after , currently pregnant    Hypothyroidism affecting pregnancy in third trimester    Umbilical vein abnormality affecting pregnancy    History of thrombocytopenia    Excessive fetal growth affecting management of pregnancy in third trimester    Supervision of high risk pregnancy in third trimester    Polyhydramnios in third trimester             Problem List    Transfer to Postpartum     Review the Delivery Report for details.     Delivery     Delivery: Vaginal, Spontaneous     YOB: 2023    Time of Birth:  Gestational Age 3:52 PM   38w0d     Anesthesia: Epidural     Delivering clinician: Yesica Funk    Forceps?   No   Vacuum? No    Shoulder dystocia present: No        Delivery narrative:   Patient presented on 3/9 for IOL/TOLAC secondary to GDMA2-metformin with known LGA fetus.  She was 2 cm and started on pitocin; she had an amniotomy at 12:50PM when she was 4 cm.  She progressed to completely dilated at 3:02PM and began pushing at 3:10PM.  Patient pushed to deliver a viable 3890g male from the LOP position over an intact perineum via  under epidural anesthesia on 3/9/2023 at 3:52PM.  No noted nuchal cord.  Right anterior shoulder was delivered with ease, followed by left posterior shoulder.  Body was then delivered with gentle traction.  Mouth and nose bulb suctioned.  3 vessel cord clamped x2 and cut after 30 seconds of delayed  clamping.  Baby was placed on mother's chest.  Cord blood was obtained and sent.  Placenta delivered spontaneously intact and Pitocin was started.  Cervix, vagina, and perineum were examined and a 2nd degree laceration was noted.  3-0 Vicryl was used to repair the laceration in a routine fashion with good hemostasis.  EBL 200mL; QBL pending, please see nursing documentation.  Apgar scores 5/7.  Mother and infant stable.      Infant     Findings: male  infant     Infant observations: Weight: 3890 g (8 lb 9.2 oz)   Length: 21.5  in  Observations/Comments:        Apgars: 5  @ 1 minute /    7  @ 5 minutes   Infant Name: Baby Boy     Placenta & Cord         Placenta delivered  Spontaneous  at   3/9/2023  3:58 PM     Cord: 3 vessels  present.   Nuchal Cord?  no   Cord blood obtained: Yes    Cord gases obtained:  No    Cord gas results: Venous:  No results found for: PHCVEN    Arterial:  No results found for: PHCART     Repair      Episiotomy: Not recorded    No    Lacerations: Yes  Laceration Information  Laceration Repaired?   Perineal: 2nd  Yes    Periurethral:       Labial:       Sulcus:       Vaginal:       Cervical:         Suture used for repair: 3-0 Vicryl   Estimated Blood Loss: 200 mL     Quantitative Blood Loss: Quantitative Blood Loss (mL): 99 mL (23 1830)        Complications     none    Disposition     Mother to Mother Baby/Postpartum in stable condition currently.  Baby to remains with mom in stable condition currently.    Yesica Funk MD  23  20:03 CST

## 2023-03-09 NOTE — ANESTHESIA PROCEDURE NOTES
Labor Epidural      Patient reassessed immediately prior to procedure    Patient location during procedure: OB  Start Time: 3/9/2023 10:16 AM  Stop Time: 3/9/2023 10:26 AM  Indication:at surgeon's request  Performed By  CRNA/LUIZ: Waldemar Washington CRNASRNA: Amando Oliveira SRNA  Preanesthetic Checklist  Completed: patient identified, IV checked, site marked, risks and benefits discussed, surgical consent, monitors and equipment checked, pre-op evaluation and timeout performed  Prep:  Pt Position:sitting  Sterile Tech:cap, gloves, mask and sterile barrier  Prep:povidone-iodine 7.5% surgical scrub  Monitoring:blood pressure monitoring, continuous pulse oximetry and EKG  Epidural Block Procedure:  Approach:midline  Guidance:landmark technique  Location:L3-L4  Needle Type:Tuohy  Needle Gauge:17 G  Loss of Resistance Medium: saline  Loss of Resistance: 6cm  Cath Depth at skin:10 cm  Paresthesia: none  Aspiration:negative  Test Dose:negative  Number of Attempts: 1  Post Assessment:  Dressing:occlusive dressing applied and secured with tape  Pt Tolerance:patient tolerated the procedure well with no apparent complications  Complications:no

## 2023-03-09 NOTE — PROGRESS NOTES
Pitocin at 14 mu/min  +epidural  FHT Cat 1  SVE 4/80/-2/soft/ant, vertex   AROM clear fluid -> 5/80/-2  Anticipate     Yesica Funk MD  3/9/2023  12:59 CST

## 2023-03-09 NOTE — ANESTHESIA PREPROCEDURE EVALUATION
Anesthesia Evaluation     Patient summary reviewed and Nursing notes reviewed   NPO Solid Status: > 8 hours  NPO Liquid Status: > 2 hours           Airway   Mallampati: II  TM distance: >3 FB  Neck ROM: full  No difficulty expected  Dental    (+) poor dentition    Pulmonary - negative pulmonary ROS and normal exam   Cardiovascular - negative cardio ROS and normal exam        Neuro/Psych  (+) psychiatric history (OCD) Depression,    GI/Hepatic/Renal/Endo    (+)   diabetes mellitus gestational, thyroid problem hypothyroidism    Musculoskeletal (-) negative ROS    Abdominal  - normal exam   Substance History - negative use     OB/GYN    (+) Pregnant,         Other - negative ROS                       Anesthesia Plan    ASA 2     epidural     intravenous induction     Anesthetic plan, risks, benefits, and alternatives have been provided, discussed and informed consent has been obtained with: patient.        CODE STATUS:    Code Status (Patient has no pulse and is not breathing): CPR (Attempt to Resuscitate)  Medical Interventions (Patient has pulse or is breathing): Full

## 2023-03-10 LAB
FETAL BLEED: NEGATIVE
NUMBER OF DOSES: NORMAL

## 2023-03-10 PROCEDURE — 0503F POSTPARTUM CARE VISIT: CPT | Performed by: OBSTETRICS & GYNECOLOGY

## 2023-03-10 PROCEDURE — 85461 HEMOGLOBIN FETAL: CPT | Performed by: OBSTETRICS & GYNECOLOGY

## 2023-03-10 PROCEDURE — 25010000002 RHO D IMMUNE GLOBULIN 1500 UNITS SOLUTION PREFILLED SYRINGE: Performed by: OBSTETRICS & GYNECOLOGY

## 2023-03-10 RX ADMIN — ACETAMINOPHEN 650 MG: 325 TABLET ORAL at 00:09

## 2023-03-10 RX ADMIN — FERROUS SULFATE TAB EC 324 MG (65 MG FE EQUIVALENT) 324 MG: 324 (65 FE) TABLET DELAYED RESPONSE at 19:44

## 2023-03-10 RX ADMIN — RHO(D) IMMUNE GLOBULIN (HUMAN) 1500 UNITS: 1500 SOLUTION INTRAMUSCULAR at 16:11

## 2023-03-10 RX ADMIN — IBUPROFEN 600 MG: 600 TABLET, FILM COATED ORAL at 08:59

## 2023-03-10 RX ADMIN — IBUPROFEN 600 MG: 600 TABLET, FILM COATED ORAL at 02:25

## 2023-03-10 NOTE — PROGRESS NOTES
"[facility]  Progress Note - Obstetrics    Name: John Puga  MRN: [id number]  Location: [id number]  Date: 3/10/2023  CSN: [id number]       PPD #1 s/p  at 38w0d secondary to IOL and GMDA2-metformin.    Patient seen and examined.  Denies headache, dizziness, chest pain, shortness of breath, nausea, vomiting.  Minimal/moderate lochia.  OOB and ambulating.  Tolerating regular diet.  Voiding without difficulty.  Breastfeeding, supplementing with formula.    PHYSICAL EXAM  BP 98/56 (BP Location: Right arm, Patient Position: Sitting)   Pulse 68   Temp 98 °F (36.7 °C) (Oral)   Resp 18   Ht 154.9 cm (61\")   Wt 65.8 kg (145 lb)   LMP 2022 (Approximate)   SpO2 97%   Breastfeeding Yes   BMI 27.40 kg/m²   General: AAOx3, no apparent distress.  Lungs: CTA B/L anteriorly, no wheezes, rales, rhonchi.  CV: Acceptable rate, regular rhythm, S1/S2 normal.  Abdomen: +BS, soft, nondistended, uterine fundus firm, nontender, and below umbilicus.  Lower extremities: No bilateral edema.  2+/4+ dorsalis pedis pulses B/L.    Intake/Output Summary (Last 24 hours) at 3/10/2023 06  Last data filed at 3/9/2023 1830  Gross per 24 hour   Intake --   Output 449 ml   Net -449 ml     IMPRESSION  John Puga is a 42 y.o.  PPD #1 s/p  at 38w0d secondary to IOL and GDMA2-metformin.  Doing well and recovering appropriately.    PLAN  1.  Postpartum s/p   - Continue routine postpartum care.  - Diet: Pregnancy/breastfeeding  - DVT prophylaxis: Ambulation  - Breastfeeding, supplementing with formula.   - Contraception: cycle monitoring. Advised she can still get pregnant while on her period.   - Discharge to home tomorrow.  Follow-up in 2 weeks (telephone visit), 6 weeks.     This document has been electronically signed by Sadie Cordova, Medical Student on March 10, 2023 06:36 CST.    Attending Attestation  As the teaching physician, I have personally re-performed the physical exam and " "medical decision making activities included in the student note.    Patient seen and examined at 6:40AM.  Doing well and recovering appropriately.  Breastfeeding w/ supplement.  Lochia minimal.  Requests d/c if possible.    BP 98/56 (BP Location: Right arm, Patient Position: Sitting)   Pulse 68   Temp 98 °F (36.7 °C) (Oral)   Resp 18   Ht 154.9 cm (61\")   Wt 65.8 kg (145 lb)   LMP 2022 (Approximate)   SpO2 97%   Breastfeeding Yes   BMI 27.40 kg/m²   Gen: NAD, AAO x3  Abd: Soft, NTND, uterus nontender and FFBU    A/P: John Puga is a 42 y.o.  PPD #1 s/p  secondary to IOL for poorly controlled GDMA2-metformin, doing well and recovering appropriately.  - Contraception: NFP  - Breastfeeding  - Discharge to home tomorrow likely (anticipate pediatrics wanting to watch baby's BS given GDM and some low BS overnight)    This document has been electronically signed by Yesica Funk MD on March 10, 2023 15:19 CST.  "

## 2023-03-10 NOTE — PLAN OF CARE
Problem: Adult Inpatient Plan of Care  Goal: Plan of Care Review  Outcome: Ongoing, Progressing  Flowsheets  Taken 3/10/2023 0743 by Yolanda Bolivar RN  Plan of Care Reviewed With:   patient   spouse  Outcome Evaluation: patient doing well, will only take tylenol/motrin as needed, refused all other medications, breastfeeding, suppl with formula, bleeding light, fundus firm, bonding well with infant  Taken 3/9/2023 1647 by Prema De La Vega, RN  Progress: improving  Goal: Patient-Specific Goal (Individualized)  Outcome: Ongoing, Progressing  Goal: Absence of Hospital-Acquired Illness or Injury  Outcome: Ongoing, Progressing  Intervention: Identify and Manage Fall Risk  Recent Flowsheet Documentation  Taken 3/10/2023 0700 by Yolanda Bolivar, RN  Safety Promotion/Fall Prevention: safety round/check completed  Taken 3/10/2023 0400 by Yolanda Bolivar RN  Safety Promotion/Fall Prevention: safety round/check completed  Taken 3/10/2023 0146 by Yolanda Bolivar RN  Safety Promotion/Fall Prevention: safety round/check completed  Taken 3/10/2023 0009 by Yolanda Bolivar, RN  Safety Promotion/Fall Prevention: (request tylenol) safety round/check completed  Taken 3/9/2023 2327 by Yolanda Bolivar RN  Safety Promotion/Fall Prevention: (breastfeeding) safety round/check completed  Taken 3/9/2023 2151 by Yolanda Bolivar, RN  Safety Promotion/Fall Prevention: (request motrin, refused tylenol at this time) safety round/check completed  Taken 3/9/2023 1933 by Yolanda Bolivar, RN  Safety Promotion/Fall Prevention: safety round/check completed  Intervention: Prevent and Manage VTE (Venous Thromboembolism) Risk  Recent Flowsheet Documentation  Taken 3/10/2023 0009 by Yolanda Bolivar, RN  Activity Management: up ad tammy  VTE Prevention/Management: (ambulates in room) patient refused intervention  Goal: Optimal Comfort and Wellbeing  Outcome: Ongoing, Progressing  Intervention: Provide Person-Centered Care  Recent Flowsheet  Documentation  Taken 3/10/2023 0009 by Yolanda Bolivar, RN  Trust Relationship/Rapport:   care explained   choices provided   questions answered   questions encouraged   reassurance provided   thoughts/feelings acknowledged  Goal: Readiness for Transition of Care  Outcome: Ongoing, Progressing     Problem: Adjustment to Role Transition (Postpartum Vaginal Delivery)  Goal: Successful Maternal Role Transition  Outcome: Ongoing, Progressing     Problem: Bleeding (Postpartum Vaginal Delivery)  Goal: Hemostasis  Outcome: Ongoing, Progressing     Problem: Infection (Postpartum Vaginal Delivery)  Goal: Absence of Infection Signs/Symptoms  Outcome: Ongoing, Progressing     Problem: Pain (Postpartum Vaginal Delivery)  Goal: Acceptable Pain Control  Outcome: Ongoing, Progressing     Problem: Urinary Retention (Postpartum Vaginal Delivery)  Goal: Effective Urinary Elimination  Outcome: Ongoing, Progressing     Problem: Breastfeeding  Goal: Effective Breastfeeding  Outcome: Ongoing, Progressing  Intervention: Promote Effective Breastfeeding  Recent Flowsheet Documentation  Taken 3/10/2023 0009 by Yolanda Bolivar, RN  Breastfeeding Assistance: support offered     Problem: Fall Injury Risk  Goal: Absence of Fall and Fall-Related Injury  Outcome: Ongoing, Progressing  Intervention: Promote Injury-Free Environment  Recent Flowsheet Documentation  Taken 3/10/2023 0700 by Yolanda Bolivar, RN  Safety Promotion/Fall Prevention: safety round/check completed  Taken 3/10/2023 0400 by Yolanda Bolivar, RN  Safety Promotion/Fall Prevention: safety round/check completed  Taken 3/10/2023 0146 by Yolanda Bolivar, RN  Safety Promotion/Fall Prevention: safety round/check completed  Taken 3/10/2023 0009 by Yolanda Bolivar, RN  Safety Promotion/Fall Prevention: (request tylenol) safety round/check completed  Taken 3/9/2023 2327 by Yolanda Bolivar, RN  Safety Promotion/Fall Prevention: (breastfeeding) safety round/check completed  Taken  3/9/2023 2151 by Yolanda Bolivar, RN  Safety Promotion/Fall Prevention: (request motrin, refused tylenol at this time) safety round/check completed  Taken 3/9/2023 1933 by Yolanda Bolivar, RN  Safety Promotion/Fall Prevention: safety round/check completed   Goal Outcome Evaluation:  Plan of Care Reviewed With: patient, spouse           Outcome Evaluation: patient doing well, will only take tylenol/motrin as needed, refused all other medications, breastfeeding, suppl with formula, bleeding light, fundus firm, bonding well with infant

## 2023-03-11 VITALS
TEMPERATURE: 98 F | DIASTOLIC BLOOD PRESSURE: 70 MMHG | BODY MASS INDEX: 27.38 KG/M2 | WEIGHT: 145 LBS | SYSTOLIC BLOOD PRESSURE: 98 MMHG | HEART RATE: 70 BPM | HEIGHT: 61 IN | OXYGEN SATURATION: 98 % | RESPIRATION RATE: 20 BRPM

## 2023-03-11 RX ORDER — IBUPROFEN 600 MG/1
600 TABLET ORAL EVERY 6 HOURS PRN
Qty: 30 TABLET | Refills: 1 | Status: SHIPPED | OUTPATIENT
Start: 2023-03-11 | End: 2023-03-30

## 2023-03-11 NOTE — CONSULTS
"Consult completed for \"PPD\". Patient with score of 10 for PPD. Patient also answered \"hardly ever\" to \"thoughts of wanting to harm self\". SW introduced self and role. Patient explained she has hx of PPD, Depression, and OCD. Patient reports she has had \"weird thoughts\" before after her other chlidren were born but that she was able to reach out for help before they were \"too overwhelming\". She reports she did not \"dwell\" on these thoughts. Patient reports that she had limited support system at those time whereas now she has a great support system. She reports she feels that those feelings and thoughts should be \"better\" this time since her support system is more supportive and that her other children are \"older\". She reports stressful times when her other children were \"very close in age\". She states that this is her fifth child and her other youngest child is 6 years old. She reports that her oldest is 14 y/o. She reports that her older children will be more of help with this baby which she feels will help minimize PPD. She states that she has counselors and [place]-based support groups within her community that she relies on that she plans to always reach out to. She reports she has sought professional counseling in [place] but that she is more comfortable with speaking with her community's counselor and support groups. Patient was smiling and polite thoughout conversation. Patient denied any current thoughts of wanting to harm herself. She reports she knows that she may have \"baby blues\". SW and patient explored options for self-care and coping skills if she starts to experience these feelings. Patient was receptive to encouragement/advice. SW also asked RN to educate and provide information regarding PPD and \"baby blues\" as well for d/c. Mother reported no other concerns and voiced she also relies on her spoues whom is also a great support to listen to her thoughts/ concerns. All questions answered and " encouraged at this time.    GATO Narvaez

## 2023-03-11 NOTE — DISCHARGE INSTRUCTIONS
Notify Physician or CNM of heavy bleeding, passing clots or foul odor to your discharge  Temp above 100.5. Burning on urination, gapping or drainage from incision or episiotomy, pain not relieved by your pain meds, redness or streaking in your breast or pain in your legs.  Take medication as prescribed  Continue taking your iron or vitamins till your refills run out or as long as you are breastfeeding  Take several rest periods during the day  Baby blues are normal, and may be present around the 3rd-4th day, if they last longer than 2-3 days contact your physician.  Pelvic Rest- No douching tampons or intercourse for 6 weeks  No lifting anything heavier than the baby  Wear a good supportive bra 24 hrs a day to prevent engorgement.  No driving the 1st 2 weeks or as long as you are taking pain meds

## 2023-03-11 NOTE — DISCHARGE SUMMARY
[facility]  Delivery Discharge Summary    Primary OB Clinician:     EDC: Estimated Date of Delivery: 3/23/23    Gestational Age:38w0d    Antepartum complications: gestational diabetes    Date of Delivery: 3/9/2023   Time of Delivery: 3:52 PM     Delivered By:  Yesica Funk     Delivery Type: , Spontaneous      Tubal Ligation: n/a    Baby:male  infant;   Apgar:  5  @ 1 minute /   Apgar:  7  @ 5 minutes   Weight: 3890 g (8 lb 9.2 oz)    Length: 21.5     Anesthesia: Epidural      Intrapartum complications: None    Laceration: Yes  Laceration Information  Laceration Repaired?   Perineal: 2nd  Yes    Periurethral:       Labial:       Sulcus:       Vaginal:       Cervical:         Suture used for repair: 3-0 Vicryl    Episiotomy: No    Placenta: Spontaneous     Feeding method: Breastfeeding Status: Yes    Rh Immune globulin given: yes    Rubella vaccine given: not applicable    Discharge Date: 3/11/2023; Discharge Time: 11:29 CST    Early Discharge:  NO    Plan:    PPD#2 - 43yo F, , s/p a  at 38 weeks gestation.  Pregnancy c/b GDMA2.  Rh-/[place]/GBSneg. Meeting postpartum milestones and stable for discharge.    Plan:  - Pain:  Ibuprofen 600mg Q6hrs prn  - GDM: Plan 75g 2hr GTT 6-12 weeks postpartum  - Breastfeeding: Continue PNV 1 tab QD  - Diet: Regular  - Activity: Shower as often as you like, but avoid tub baths or swimming until after your postpartum checkup. There should be nothing placed in the vagina until after your postpartum checkup. This means no tampons, douching or intercourse (sex).  - Disposition: Discharge to home with Follow-up in 6 weeks for routine postpartum care and contraceptive management        Address and phone number verified and same.  Follow-up appointment in 6 weeks.

## 2023-03-11 NOTE — PLAN OF CARE
Problem: Adult Inpatient Plan of Care  Goal: Plan of Care Review  Outcome: Ongoing, Progressing  Flowsheets  Taken 3/10/2023 1833 by Kinga Gutierrez, RN  Progress: improving  Plan of Care Reviewed With:   patient   spouse  Taken 3/10/2023 0743 by Yolanda Bolivar, RN  Outcome Evaluation: patient doing well, will only take tylenol/motrin as needed, refused all other medications, breastfeeding, suppl with formula, bleeding light, fundus firm, bonding well with infant

## 2023-03-30 ENCOUNTER — POSTPARTUM VISIT (OUTPATIENT)
Dept: OBSTETRICS AND GYNECOLOGY | Facility: CLINIC | Age: 43
End: 2023-03-30

## 2023-03-30 VITALS
WEIGHT: 128 LBS | BODY MASS INDEX: 22.68 KG/M2 | HEIGHT: 63 IN | DIASTOLIC BLOOD PRESSURE: 60 MMHG | SYSTOLIC BLOOD PRESSURE: 102 MMHG

## 2023-03-30 DIAGNOSIS — I49.8 PERIODIC HEART FLUTTER: ICD-10-CM

## 2023-03-30 NOTE — PROGRESS NOTES
"Postpartum visit      John Puga is a 42 y.o.  s/p , Spontaneous on 3/9/23 at 38+0 secondary to GDMA2 who presents today for postpartum check.  The patient states overall doing well.  Patient denies postpartum depression.  Menstrual cycles have not resumed, bleeding light.  Breastfeeding.  Declines for contraception.  She has not resumed sexual intercourse.  Denies bowel or bladder issues.    Feeling occasional heart fluttering, No chest pain or SOA.     3/9/23 IOL GDMA2, AMA, h/o recurrent miscarriages, Rh neg, h/o , hypothyroidism, Umbilical vein abnormalitiy, excessive fetal growth, polyhydramnios; APGARs 5+7, weight 3890 g, baby boy, 2nd degree    PHYSICAL EXAM:    /60 (BP Location: Left arm, Patient Position: Sitting, Cuff Size: Adult)   Ht 160 cm (63\")   Wt 58.1 kg (128 lb)   LMP 2022 (Approximate)   Breastfeeding Yes   BMI 22.67 kg/m²   Gen: well appearing,NAD  Abdomen: benign, no masses, soft, non-tender.  Extremities: No deep calf tenderness.  Postpartum Depression Screening Questionnaire: 7, no  treatment indicated.    IMPRESSION/PLAN: John Puga is a 42 y.o.  s/p , Spontaneous on 3/9/23.  Doing well.    - Recovered nicely from her delivery  - Contraception: cycle monitoring  - Heart flutters: recommend po hydration, TSH, if persisting may need EKG +/- referral to cardiology  - GDMA2-needs 2 hr pp GTT  - RTC 4-5 weeks for final PP visit        This document has been electronically signed by Marla Dahl DO on 2023 09:58 CDT     "

## 2025-02-06 NOTE — ANESTHESIA POSTPROCEDURE EVALUATION
Patient: John Puga    Procedure Summary     Date: 21 Room / Location: [facility] OR  / [facility] OR    Anesthesia Start: 1054 Anesthesia Stop: 1202    Procedure: Suction dilation and curettage (N/A Vagina) Diagnosis:       Incomplete       (Incomplete  [O03.4])    Surgeons: Yesica Funk MD Provider: Carrie Ovalle CRNA    Anesthesia Type: general ASA Status: 2          Anesthesia Type: general    Vitals  Vitals Value Taken Time   BP 83/50 21 1158   Temp 97.3 °F (36.3 °C) 21 1158   Pulse 105 21 1158   Resp 16 21 1158   SpO2 97 % 21 1158           Post Anesthesia Care and Evaluation    Patient location during evaluation: PACU  Patient participation: waiting for patient participation  Level of consciousness: sleepy but conscious  Pain score: 0  Pain management: adequate  Airway patency: patent  Anesthetic complications: No anesthetic complications  PONV Status: none  Cardiovascular status: hemodynamically stable and blood pressure returned to baseline  Respiratory status: acceptable, room air and oral airway  Hydration status: acceptable    Comments: ---------------------------               21                      1158         ---------------------------   BP:         (!) 83/50       Pulse:         105          Resp:          16           Temp:   97.3 °F (36.3 °C)   SpO2:          97%         ---------------------------       same name as above

## (undated) DEVICE — GLV SURG SENSICARE PI PF LF 7 GRN STRL

## (undated) DEVICE — GLV SURG SENSICARE PI ORTHO SZ7.5 LF STRL

## (undated) DEVICE — CATHETER,FOLEY,100%SILICONE,16FR,10ML,LF: Brand: MEDLINE

## (undated) DEVICE — STERILE POLYISOPRENE POWDER-FREE SURGICAL GLOVES WITH EMOLLIENT COATING: Brand: PROTEXIS

## (undated) DEVICE — VACURETTE CRV RIGD 8MM DISP

## (undated) DEVICE — DRAPE UNDERBUTTOCKS W FLUID POUCH 40X44IN

## (undated) DEVICE — STERILE POLYISOPRENE POWDER-FREE SURGICAL GLOVES: Brand: PROTEXIS

## (undated) DEVICE — CANN VAC GYN VACURETTE BERKELEY CRV 10MM 1P/U STRL

## (undated) DEVICE — PK D AND C 60

## (undated) DEVICE — ST COL BERKELY TBG

## (undated) DEVICE — GOWN,AURORA,NOREINF,RAGLAN,XL,STERILE: Brand: MEDLINE

## (undated) DEVICE — SOL IRR NACL 0.9PCT BT 1000ML